# Patient Record
Sex: FEMALE | Race: BLACK OR AFRICAN AMERICAN | NOT HISPANIC OR LATINO | URBAN - METROPOLITAN AREA
[De-identification: names, ages, dates, MRNs, and addresses within clinical notes are randomized per-mention and may not be internally consistent; named-entity substitution may affect disease eponyms.]

---
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
## 2020-01-05 ENCOUNTER — TRANSCRIPTION ENCOUNTER (OUTPATIENT)
Age: 20
End: 2020-01-05

## 2020-03-09 ENCOUNTER — TRANSCRIPTION ENCOUNTER (OUTPATIENT)
Age: 20
End: 2020-03-09

## 2020-03-11 ENCOUNTER — APPOINTMENT (OUTPATIENT)
Dept: ORTHOPEDIC SURGERY | Facility: CLINIC | Age: 20
End: 2020-03-11
Payer: COMMERCIAL

## 2020-03-11 VITALS — HEIGHT: 65 IN

## 2020-03-11 DIAGNOSIS — M54.2 CERVICALGIA: ICD-10-CM

## 2020-03-11 DIAGNOSIS — S13.9XXA SPRAIN OF JOINTS AND LIGAMENTS OF UNSPECIFIED PARTS OF NECK, INITIAL ENCOUNTER: ICD-10-CM

## 2020-03-11 PROCEDURE — 99203 OFFICE O/P NEW LOW 30 MIN: CPT

## 2020-03-11 PROCEDURE — 72040 X-RAY EXAM NECK SPINE 2-3 VW: CPT

## 2020-03-11 NOTE — HISTORY OF PRESENT ILLNESS
[de-identified] : Ms. JESUS TOLENTINO  is a 19 year old female who presents with neck pain since last Tuesday when she cracked her neck.  Initially  it was not that bad, but then on Saturday her pain increased and she went to urgent care.    She has been  taking nsaids and a muscle relaxer and her symptoms have improved.  Denies any UE radicular symptoms.  Normal bowel and bladder control.   Denies any recent fevers, chills, sweats, weight loss, or infection.\par \par The patients past medical history, past surgical history, medications, allergies, and social history were reviewed by me today with the patient and documented accordingly.  In addition, the patient's family history, which is noncontributory to their visit, was also reviewed.\par

## 2020-03-11 NOTE — DISCUSSION/SUMMARY
[de-identified] : Overall she is improving. She will continue with nonsurgical measures. She will let me know of any changes or worsening of her symptoms.

## 2021-07-31 ENCOUNTER — EMERGENCY (EMERGENCY)
Facility: HOSPITAL | Age: 21
LOS: 1 days | Discharge: HOME | End: 2021-07-31
Attending: STUDENT IN AN ORGANIZED HEALTH CARE EDUCATION/TRAINING PROGRAM | Admitting: EMERGENCY MEDICINE
Payer: COMMERCIAL

## 2021-07-31 VITALS
SYSTOLIC BLOOD PRESSURE: 118 MMHG | HEART RATE: 53 BPM | DIASTOLIC BLOOD PRESSURE: 69 MMHG | TEMPERATURE: 98 F | RESPIRATION RATE: 16 BRPM | OXYGEN SATURATION: 100 %

## 2021-07-31 DIAGNOSIS — Z98.89 OTHER SPECIFIED POSTPROCEDURAL STATES: Chronic | ICD-10-CM

## 2021-07-31 PROCEDURE — 10080 I&D PILONIDAL CYST SIMPLE: CPT

## 2021-07-31 PROCEDURE — 99283 EMERGENCY DEPT VISIT LOW MDM: CPT | Mod: 25

## 2021-07-31 RX ORDER — IBUPROFEN 200 MG
400 TABLET ORAL ONCE
Refills: 0 | Status: COMPLETED | OUTPATIENT
Start: 2021-07-31 | End: 2021-07-31

## 2021-07-31 RX ORDER — AZTREONAM 2 G
1 VIAL (EA) INJECTION
Qty: 10 | Refills: 0
Start: 2021-07-31 | End: 2021-08-04

## 2021-07-31 RX ORDER — ACETAMINOPHEN 500 MG
650 TABLET ORAL ONCE
Refills: 0 | Status: COMPLETED | OUTPATIENT
Start: 2021-07-31 | End: 2021-07-31

## 2021-07-31 RX ORDER — LIDOCAINE HCL 20 MG/ML
5 VIAL (ML) INJECTION ONCE
Refills: 0 | Status: COMPLETED | OUTPATIENT
Start: 2021-07-31 | End: 2021-07-31

## 2021-07-31 RX ADMIN — Medication 650 MILLIGRAM(S): at 13:07

## 2021-07-31 RX ADMIN — Medication 400 MILLIGRAM(S): at 13:07

## 2021-07-31 RX ADMIN — Medication 5 MILLILITER(S): at 13:10

## 2021-07-31 NOTE — ED ADULT TRIAGE NOTE - CHIEF COMPLAINT QUOTE
pt c/o abscess to tailbone and worsening pain. Last took Advil midnight last night. Denies fever, drainage to site. No PMH

## 2021-07-31 NOTE — ED PROVIDER NOTE - CLINICAL SUMMARY MEDICAL DECISION MAKING FREE TEXT BOX
This is a 20 yr old F, no pmh with c/o abscess to the tailone. Pt states it started Monday and it is getting worst, swollen, painful unable to sit.  Wound care-   Abx sent to pharmacy

## 2021-07-31 NOTE — ED PROVIDER NOTE - PATIENT PORTAL LINK FT
You can access the FollowMyHealth Patient Portal offered by [facility] by registering at the following website: [email or web address]. By joining [facility] [employer] [facility]’s FollowMyHealth portal, you will also be able to view your health information using other applications (apps) compatible with our system.

## 2021-07-31 NOTE — ED ADULT NURSE NOTE - OBJECTIVE STATEMENT
19y/o female A&ox4, ambulatory received in intake1. pt c/o abscess to tailbone. pt reports taking motrin with no relief of pain. respirations even and unlabored. denies chest pain, sob, dizziness, lightheadedness, abd pain, back pain. NP at bedside for drainage of abscess. md at bedside for eval. medicated as per MD orders. pt in NAD. bed in lowest position, side rails up, call bell in hand, safety maintained. awaiting further orders. will continue to monitor.

## 2021-07-31 NOTE — ED PROVIDER NOTE - OBJECTIVE STATEMENT
This is a 20 yr old F, no pmh with c/o abscess to the tailone. Pt states it started Monday and it is getting worst, swollen, painful unable to sit. This is a 20 yr old F, no pmh with c/o abscess to the tailbone. Pt states it started Monday and it is getting worst, swollen, painful unable to sit, uncomfortable, crying.

## 2021-07-31 NOTE — ED PROVIDER NOTE - ATTENDING CONTRIBUTION TO CARE
I (Faviola) agree with above, I performed a history and physical. Counseled [facility] staff on medical decision making as documented, additionally and/or with the following exceptions: patient was ammenable to drainage in the ED, not septic, abx not indicated, patient tolerated the procedure well, given follow up and return precautions.

## 2021-08-02 LAB
CULTURE RESULTS: SIGNIFICANT CHANGE UP
SPECIMEN SOURCE: SIGNIFICANT CHANGE UP

## 2021-08-02 NOTE — ED PROCEDURE NOTE - ATTENDING CONTRIBUTION TO CARE
I (Faviola) agree with above, I performed a history and physical. Counseled [facility] staff on medical decision making as documented, additionally and/or with the following exceptions: the pilonidal cysts was anesthetized and a 5mm incision was performed at the dome of the abscess and the the absecss was cultured and drained, given surgery follow up.

## 2023-01-26 ENCOUNTER — EMERGENCY (EMERGENCY)
Facility: HOSPITAL | Age: 23
LOS: 1 days | Discharge: HOME | End: 2023-01-26
Attending: EMERGENCY MEDICINE
Payer: COMMERCIAL

## 2023-01-26 VITALS
HEART RATE: 97 BPM | WEIGHT: 210.1 LBS | DIASTOLIC BLOOD PRESSURE: 84 MMHG | OXYGEN SATURATION: 99 % | SYSTOLIC BLOOD PRESSURE: 131 MMHG | HEIGHT: 65 IN | TEMPERATURE: 99 F | RESPIRATION RATE: 16 BRPM

## 2023-01-26 DIAGNOSIS — Z98.89 OTHER SPECIFIED POSTPROCEDURAL STATES: Chronic | ICD-10-CM

## 2023-01-26 PROCEDURE — 99053 MED SERV 10PM-8AM 24 HR FAC: CPT

## 2023-01-26 PROCEDURE — 99284 EMERGENCY DEPT VISIT MOD MDM: CPT

## 2023-01-27 VITALS
TEMPERATURE: 98 F | OXYGEN SATURATION: 100 % | HEART RATE: 80 BPM | SYSTOLIC BLOOD PRESSURE: 111 MMHG | RESPIRATION RATE: 17 BRPM | DIASTOLIC BLOOD PRESSURE: 73 MMHG

## 2023-01-27 LAB
ALBUMIN SERPL ELPH-MCNC: 4.3 G/DL — SIGNIFICANT CHANGE UP (ref 3.3–5)
ALP SERPL-CCNC: 102 U/L — SIGNIFICANT CHANGE UP (ref 40–120)
ALT FLD-CCNC: 21 U/L — SIGNIFICANT CHANGE UP (ref 10–45)
ANION GAP SERPL CALC-SCNC: 12 MMOL/L — SIGNIFICANT CHANGE UP (ref 5–17)
APPEARANCE UR: CLEAR — SIGNIFICANT CHANGE UP
APTT BLD: 19.7 SEC — LOW (ref 27.5–35.5)
AST SERPL-CCNC: 20 U/L — SIGNIFICANT CHANGE UP (ref 10–40)
BACTERIA # UR AUTO: ABNORMAL
BASOPHILS # BLD AUTO: 0.03 K/UL — SIGNIFICANT CHANGE UP (ref 0–0.2)
BASOPHILS NFR BLD AUTO: 0.5 % — SIGNIFICANT CHANGE UP (ref 0–2)
BILIRUB SERPL-MCNC: 0.2 MG/DL — SIGNIFICANT CHANGE UP (ref 0.2–1.2)
BILIRUB UR-MCNC: NEGATIVE — SIGNIFICANT CHANGE UP
BLD GP AB SCN SERPL QL: NEGATIVE — SIGNIFICANT CHANGE UP
BUN SERPL-MCNC: 10 MG/DL — SIGNIFICANT CHANGE UP (ref 7–23)
CALCIUM SERPL-MCNC: 9.6 MG/DL — SIGNIFICANT CHANGE UP (ref 8.4–10.5)
CHLORIDE SERPL-SCNC: 102 MMOL/L — SIGNIFICANT CHANGE UP (ref 96–108)
CO2 SERPL-SCNC: 25 MMOL/L — SIGNIFICANT CHANGE UP (ref 22–31)
COLOR SPEC: SIGNIFICANT CHANGE UP
CREAT SERPL-MCNC: 0.76 MG/DL — SIGNIFICANT CHANGE UP (ref 0.5–1.3)
DIFF PNL FLD: NEGATIVE — SIGNIFICANT CHANGE UP
EGFR: 114 ML/MIN/1.73M2 — SIGNIFICANT CHANGE UP
EOSINOPHIL # BLD AUTO: 0.16 K/UL — SIGNIFICANT CHANGE UP (ref 0–0.5)
EOSINOPHIL NFR BLD AUTO: 2.5 % — SIGNIFICANT CHANGE UP (ref 0–6)
EPI CELLS # UR: 3 /HPF — SIGNIFICANT CHANGE UP
GLUCOSE SERPL-MCNC: 82 MG/DL — SIGNIFICANT CHANGE UP (ref 70–99)
GLUCOSE UR QL: NEGATIVE — SIGNIFICANT CHANGE UP
HCG SERPL-ACNC: <2 MIU/ML — SIGNIFICANT CHANGE UP
HCT VFR BLD CALC: 34.5 % — SIGNIFICANT CHANGE UP (ref 34.5–45)
HGB BLD-MCNC: 10.6 G/DL — LOW (ref 11.5–15.5)
HYALINE CASTS # UR AUTO: 1 /LPF — SIGNIFICANT CHANGE UP (ref 0–2)
IMM GRANULOCYTES NFR BLD AUTO: 0.2 % — SIGNIFICANT CHANGE UP (ref 0–0.9)
INR BLD: 1.05 RATIO — SIGNIFICANT CHANGE UP (ref 0.88–1.16)
KETONES UR-MCNC: NEGATIVE — SIGNIFICANT CHANGE UP
LEUKOCYTE ESTERASE UR-ACNC: NEGATIVE — SIGNIFICANT CHANGE UP
LIDOCAIN IGE QN: 16 U/L — SIGNIFICANT CHANGE UP (ref 7–60)
LYMPHOCYTES # BLD AUTO: 2.53 K/UL — SIGNIFICANT CHANGE UP (ref 1–3.3)
LYMPHOCYTES # BLD AUTO: 39.4 % — SIGNIFICANT CHANGE UP (ref 13–44)
MCHC RBC-ENTMCNC: 23.8 PG — LOW (ref 27–34)
MCHC RBC-ENTMCNC: 30.7 GM/DL — LOW (ref 32–36)
MCV RBC AUTO: 77.5 FL — LOW (ref 80–100)
MONOCYTES # BLD AUTO: 0.54 K/UL — SIGNIFICANT CHANGE UP (ref 0–0.9)
MONOCYTES NFR BLD AUTO: 8.4 % — SIGNIFICANT CHANGE UP (ref 2–14)
NEUTROPHILS # BLD AUTO: 3.15 K/UL — SIGNIFICANT CHANGE UP (ref 1.8–7.4)
NEUTROPHILS NFR BLD AUTO: 49 % — SIGNIFICANT CHANGE UP (ref 43–77)
NITRITE UR-MCNC: NEGATIVE — SIGNIFICANT CHANGE UP
NRBC # BLD: 0 /100 WBCS — SIGNIFICANT CHANGE UP (ref 0–0)
PH UR: 6 — SIGNIFICANT CHANGE UP (ref 5–8)
PLATELET # BLD AUTO: 337 K/UL — SIGNIFICANT CHANGE UP (ref 150–400)
POTASSIUM SERPL-MCNC: 3.6 MMOL/L — SIGNIFICANT CHANGE UP (ref 3.5–5.3)
POTASSIUM SERPL-SCNC: 3.6 MMOL/L — SIGNIFICANT CHANGE UP (ref 3.5–5.3)
PROT SERPL-MCNC: 7.6 G/DL — SIGNIFICANT CHANGE UP (ref 6–8.3)
PROT UR-MCNC: NEGATIVE — SIGNIFICANT CHANGE UP
PROTHROM AB SERPL-ACNC: 12.1 SEC — SIGNIFICANT CHANGE UP (ref 10.5–13.4)
RBC # BLD: 4.45 M/UL — SIGNIFICANT CHANGE UP (ref 3.8–5.2)
RBC # FLD: 15.8 % — HIGH (ref 10.3–14.5)
RBC CASTS # UR COMP ASSIST: 2 /HPF — SIGNIFICANT CHANGE UP (ref 0–4)
RH IG SCN BLD-IMP: POSITIVE — SIGNIFICANT CHANGE UP
SODIUM SERPL-SCNC: 139 MMOL/L — SIGNIFICANT CHANGE UP (ref 135–145)
SP GR SPEC: 1.03 — HIGH (ref 1.01–1.02)
UROBILINOGEN FLD QL: NEGATIVE — SIGNIFICANT CHANGE UP
WBC # BLD: 6.42 K/UL — SIGNIFICANT CHANGE UP (ref 3.8–10.5)
WBC # FLD AUTO: 6.42 K/UL — SIGNIFICANT CHANGE UP (ref 3.8–10.5)
WBC UR QL: 3 /HPF — SIGNIFICANT CHANGE UP (ref 0–5)

## 2023-01-27 PROCEDURE — 80053 COMPREHEN METABOLIC PANEL: CPT

## 2023-01-27 PROCEDURE — 81001 URINALYSIS AUTO W/SCOPE: CPT

## 2023-01-27 PROCEDURE — 85610 PROTHROMBIN TIME: CPT

## 2023-01-27 PROCEDURE — 74177 CT ABD & PELVIS W/CONTRAST: CPT | Mod: MA

## 2023-01-27 PROCEDURE — 87086 URINE CULTURE/COLONY COUNT: CPT

## 2023-01-27 PROCEDURE — 85025 COMPLETE CBC W/AUTO DIFF WBC: CPT

## 2023-01-27 PROCEDURE — 74177 CT ABD & PELVIS W/CONTRAST: CPT | Mod: 26,MA

## 2023-01-27 PROCEDURE — 83690 ASSAY OF LIPASE: CPT

## 2023-01-27 PROCEDURE — 85730 THROMBOPLASTIN TIME PARTIAL: CPT

## 2023-01-27 PROCEDURE — 36415 COLL VENOUS BLD VENIPUNCTURE: CPT

## 2023-01-27 PROCEDURE — 84702 CHORIONIC GONADOTROPIN TEST: CPT

## 2023-01-27 PROCEDURE — 86901 BLOOD TYPING SEROLOGIC RH(D): CPT

## 2023-01-27 PROCEDURE — 96375 TX/PRO/DX INJ NEW DRUG ADDON: CPT

## 2023-01-27 PROCEDURE — 86900 BLOOD TYPING SEROLOGIC ABO: CPT

## 2023-01-27 PROCEDURE — 96366 THER/PROPH/DIAG IV INF ADDON: CPT

## 2023-01-27 PROCEDURE — 86850 RBC ANTIBODY SCREEN: CPT

## 2023-01-27 PROCEDURE — 96365 THER/PROPH/DIAG IV INF INIT: CPT | Mod: XU

## 2023-01-27 PROCEDURE — 99284 EMERGENCY DEPT VISIT MOD MDM: CPT | Mod: 25

## 2023-01-27 RX ORDER — ONDANSETRON 8 MG/1
4 TABLET, FILM COATED ORAL ONCE
Refills: 0 | Status: COMPLETED | OUTPATIENT
Start: 2023-01-27 | End: 2023-01-27

## 2023-01-27 RX ORDER — ACETAMINOPHEN 500 MG
1000 TABLET ORAL ONCE
Refills: 0 | Status: COMPLETED | OUTPATIENT
Start: 2023-01-27 | End: 2023-01-27

## 2023-01-27 RX ORDER — KETOROLAC TROMETHAMINE 30 MG/ML
15 SYRINGE (ML) INJECTION ONCE
Refills: 0 | Status: DISCONTINUED | OUTPATIENT
Start: 2023-01-27 | End: 2023-01-27

## 2023-01-27 RX ORDER — SODIUM CHLORIDE 9 MG/ML
1000 INJECTION INTRAMUSCULAR; INTRAVENOUS; SUBCUTANEOUS ONCE
Refills: 0 | Status: COMPLETED | OUTPATIENT
Start: 2023-01-27 | End: 2023-01-27

## 2023-01-27 RX ADMIN — Medication 400 MILLIGRAM(S): at 05:03

## 2023-01-27 RX ADMIN — Medication 15 MILLIGRAM(S): at 06:43

## 2023-01-27 RX ADMIN — ONDANSETRON 4 MILLIGRAM(S): 8 TABLET, FILM COATED ORAL at 05:03

## 2023-01-27 RX ADMIN — Medication 1000 MILLIGRAM(S): at 06:39

## 2023-01-27 RX ADMIN — SODIUM CHLORIDE 1000 MILLILITER(S): 9 INJECTION INTRAMUSCULAR; INTRAVENOUS; SUBCUTANEOUS at 08:12

## 2023-01-27 RX ADMIN — Medication 15 MILLIGRAM(S): at 08:12

## 2023-01-27 RX ADMIN — SODIUM CHLORIDE 1000 MILLILITER(S): 9 INJECTION INTRAMUSCULAR; INTRAVENOUS; SUBCUTANEOUS at 05:02

## 2023-01-27 RX ADMIN — Medication 1000 MILLIGRAM(S): at 08:12

## 2023-01-27 NOTE — ED PROVIDER NOTE - PROGRESS NOTE DETAILS
Patrick Ferrer, PGY1 - CT result discussed with the patient. No acute patholoiges. PAtient feels better, but mildly tender. Will give toradol. Will reassess. PO challenge. and DC with return precautions. Glo Bauer, PGY-1: Pt reassessed. Reports improvement of pain after toradol. Passed PO challenge.   No nausea/vomiting

## 2023-01-27 NOTE — ED PROVIDER NOTE - PHYSICAL EXAMINATION
General: Uncomfortable appearing.   HEENT: NCAT. Non erythematous, non swollen tonsils. No exudates.  Cardiac: RRR, 2+ radial pulses  Chest: CTA  Abdomen: soft, non-distended, + ttp RLQ. No McBurney's point tenderness, + obturator sign, no rebound or guarding  Extremities: no peripheral edema, calf tenderness, or leg size discrepancies  Skin: no rashes  Neuro: AAOx3, motor and sensory grossly intact.   Psych: mood and affect appropriate COUGH

## 2023-01-27 NOTE — ED PROVIDER NOTE - NSTIMEPROVIDERCAREINITIATE_GEN_ER
82 y/o female with history of CAD s/p stents 2013, hypothyroidism, HLD, HTN, HLD, DM, dementia (baseline AOx1) presents to the ED for weakness/fall and increased urinary frequency, found to have positive UA in the ED, admitted for IV abx.  UTI refractory to Macrobid poor penetration of Macrobid for lower  infection with GFR < 60.    Suggest:  - ceftriaxone for 3 days  - f/u UC    Please call the ID service [phone] with questions or concerns over the weekend 27-Jan-2023 02:59

## 2023-01-27 NOTE — ED PROVIDER NOTE - NSFOLLOWUPINSTRUCTIONS_ED_ALL_ED_FT
Follow up with your primary care within 1-3 days  Treat your pain with advil and tylenol.  Come back for any worsening or concerning symptoms    Acute Abdominal Pain    WHAT YOU NEED TO KNOW:  The cause of your abdominal pain may not be found. If a cause is found, treatment will depend on what the cause is.    DISCHARGE INSTRUCTIONS:    Seek care immediately if:  You vomit blood or cannot stop vomiting.  You have blood in your bowel movement or it looks like tar.  You have bleeding from your rectum.  Your abdomen is larger than usual, more painful, and hard.  You have severe pain in your abdomen.  You stop passing gas and having bowel movements.  You feel weak, dizzy, or faint.  Contact your healthcare provider if:  You have a fever.  You have new signs and symptoms.  Your symptoms do not get better with treatment.  You have questions or concerns about your condition or care.  Medicines may be given to decrease pain, treat an infection, and manage your symptoms. Take your medicine as directed. Call your healthcare provider if you think your medicine is not helping or if you have side effects. Tell him if you are allergic to any medicine. Keep a list of the medicines, vitamins, and herbs you take. Include the amounts, and when and why you take them. Bring the list or the pill bottles to follow-up visits. Carry your medicine list with you in case of an emergency.    Manage your symptoms:    Apply heat on your abdomen for 20 to 30 minutes every 2 hours for as many days as directed. Heat helps decrease pain and muscle spasms.  Manage your stress. Stress may cause abdominal pain. Your healthcare provider may recommend relaxation techniques and deep breathing exercises to help decrease your stress. Your healthcare provider may recommend you talk to someone about your stress or anxiety, such as a counselor or a trusted friend. Get plenty of sleep and exercise regularly.  Limit or do not drink alcohol. Alcohol can make your abdominal pain worse. Ask your healthcare provider if it is safe for you to drink alcohol. Also ask how much is safe for you to drink.  Do not smoke. Nicotine and other chemicals in cigarettes can damage your esophagus and stomach. Ask your healthcare provider for information if you currently smoke and need help to quit. E-cigarettes or smokeless tobacco still contain nicotine. Talk to your healthcare provider before you use these products.  Make changes to the food you eat as directed: Do not eat foods that cause abdominal pain or other symptoms. Eat small meals more often.  Eat more high-fiber foods if you are constipated. High-fiber foods include fruits, vegetables, whole-grain foods, and legumes.  Do not eat foods that cause gas if you have bloating. Examples include broccoli, cabbage, and cauliflower. Do not drink soda or carbonated drinks, because these may also cause gas.  Do not eat foods or drinks that contain sorbitol or fructose if you have diarrhea and bloating. Some examples are fruit juices, candy, jelly, and sugar-free gum.  Do not eat high-fat foods, such as fried foods, cheeseburgers, hot dogs, and desserts.  Limit or do not drink caffeine. Caffeine may make symptoms, such as heart burn or nausea, worse.  Drink plenty of liquids to prevent dehydration from diarrhea or vomiting. Ask your healthcare provider how much liquid to drink each day and which liquids are best for you.  Follow up with your healthcare provider as directed: Write down your questions so you remember to ask them during your visits.

## 2023-01-27 NOTE — ED PROVIDER NOTE - CLINICAL SUMMARY MEDICAL DECISION MAKING FREE TEXT BOX
This is a 22 year old female with no significant pmh or surgical history presenting with 5 hours of RLQ abdominal pain that started suddenly associated with nausea. Denies any vaginal bleeding, urinary sxs. Low grade temp of 37.2 C. RLQ tenderness. + obturator sign. Given acuteness of RLQ pain associated with nausea, will obtain CT w/ IV contrast to eval for appendicitis. Will obtain labs, treat with IV fluids and IV tylenol. Will obtain urinalysis. Pending reassess and f/u with CT imaging.

## 2023-01-27 NOTE — ED PROVIDER NOTE - PATIENT PORTAL LINK FT
You can access the FollowMyHealth Patient Portal offered by [facility] by registering at the following website: [email or web address]. By joining [employer]’s FollowMyHealth portal, you will also be able to view your health information using other applications (apps) compatible with our system.

## 2023-01-27 NOTE — ED PROVIDER NOTE - OBJECTIVE STATEMENT
This is a 22 year old female with no significant pmh or surgical history presenting with 5 hours of RLQ abdominal pain that started suddenly associated with nausea. Denies any vaginal bleeding, urinary sxs. Hasn't taken any pain medications so far. Hurts to move, walk, sit.
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
## 2023-01-27 NOTE — ED PROVIDER NOTE - ATTENDING CONTRIBUTION TO CARE
Patient presents with sudden onset right lower quadrant pain onset a few hours ago along with nausea.  Patient denies diarrhea, urinary symptoms.  On exam, patient looks uncomfortable and has tenderness in the right lower quadrant without guarding or rebound.  Vital signs are unremarkable.  Patient will get labs and a CT to assess for acute intra-abdominal pathology such as appendicitis versus other acute surgical process, less likely suspicion for GYN pathology but will reassess after initial imaging.

## 2023-01-29 LAB
CULTURE RESULTS: SIGNIFICANT CHANGE UP
SPECIMEN SOURCE: SIGNIFICANT CHANGE UP

## 2023-01-30 NOTE — ED POST DISCHARGE NOTE - DETAILS
1/30/23: Results d/w pt, pt reports urinary urgency, advised pt to have repeat outpatient urine testing and pt report she had urine testing performed yesterday at an  and she will f/u the results. -[place] PA-C

## 2024-04-17 NOTE — ED ADULT NURSE NOTE - NSFALLRSKOUTCOME_ED_ALL_ED
Lunch ordered for this patient at this time. Pt resting comfortably in bed, denies needs at this time. Patient in no acute distress. Call light within reach, bed in lowest position, side rails up x2.        Universal Safety Interventions

## 2024-08-30 PROBLEM — Z78.9 OTHER SPECIFIED HEALTH STATUS: Chronic | Status: ACTIVE | Noted: 2023-01-27

## 2024-09-07 ENCOUNTER — EMERGENCY (EMERGENCY)
Facility: HOSPITAL | Age: 24
LOS: 1 days | End: 2024-09-07
Admitting: EMERGENCY MEDICINE
Payer: COMMERCIAL

## 2024-09-07 VITALS
HEART RATE: 96 BPM | TEMPERATURE: 99 F | RESPIRATION RATE: 16 BRPM | DIASTOLIC BLOOD PRESSURE: 78 MMHG | HEIGHT: 65 IN | WEIGHT: 220.02 LBS | SYSTOLIC BLOOD PRESSURE: 114 MMHG | OXYGEN SATURATION: 98 %

## 2024-09-07 DIAGNOSIS — Z98.89 OTHER SPECIFIED POSTPROCEDURAL STATES: Chronic | ICD-10-CM

## 2024-09-07 PROCEDURE — L9991: CPT

## 2024-09-08 ENCOUNTER — EMERGENCY (EMERGENCY)
Facility: HOSPITAL | Age: 24
LOS: 1 days | Discharge: HOME | End: 2024-09-08
Attending: EMERGENCY MEDICINE
Payer: COMMERCIAL

## 2024-09-08 VITALS
OXYGEN SATURATION: 99 % | TEMPERATURE: 99 F | HEIGHT: 65 IN | SYSTOLIC BLOOD PRESSURE: 115 MMHG | DIASTOLIC BLOOD PRESSURE: 74 MMHG | RESPIRATION RATE: 18 BRPM | WEIGHT: 214.95 LBS | HEART RATE: 91 BPM

## 2024-09-08 DIAGNOSIS — Z98.89 OTHER SPECIFIED POSTPROCEDURAL STATES: Chronic | ICD-10-CM

## 2024-09-08 LAB
ADD ON TEST-SPECIMEN IN LAB: SIGNIFICANT CHANGE UP
ADD ON TEST-SPECIMEN IN LAB: SIGNIFICANT CHANGE UP
ALBUMIN SERPL ELPH-MCNC: 3.8 G/DL — SIGNIFICANT CHANGE UP (ref 3.3–5)
ALP SERPL-CCNC: 82 U/L — SIGNIFICANT CHANGE UP (ref 40–120)
ALT FLD-CCNC: 18 U/L — SIGNIFICANT CHANGE UP (ref 10–45)
ANION GAP SERPL CALC-SCNC: 15 MMOL/L — SIGNIFICANT CHANGE UP (ref 5–17)
AST SERPL-CCNC: 16 U/L — SIGNIFICANT CHANGE UP (ref 10–40)
BASOPHILS # BLD AUTO: 0.02 K/UL — SIGNIFICANT CHANGE UP (ref 0–0.2)
BASOPHILS NFR BLD AUTO: 0.3 % — SIGNIFICANT CHANGE UP (ref 0–2)
BILIRUB SERPL-MCNC: 0.3 MG/DL — SIGNIFICANT CHANGE UP (ref 0.2–1.2)
BLD GP AB SCN SERPL QL: NEGATIVE — SIGNIFICANT CHANGE UP
BUN SERPL-MCNC: 6 MG/DL — LOW (ref 7–23)
CALCIUM SERPL-MCNC: 9.5 MG/DL — SIGNIFICANT CHANGE UP (ref 8.4–10.5)
CHLORIDE SERPL-SCNC: 99 MMOL/L — SIGNIFICANT CHANGE UP (ref 96–108)
CO2 SERPL-SCNC: 21 MMOL/L — LOW (ref 22–31)
CREAT SERPL-MCNC: 0.7 MG/DL — SIGNIFICANT CHANGE UP (ref 0.5–1.3)
EGFR: 125 ML/MIN/1.73M2 — SIGNIFICANT CHANGE UP
EOSINOPHIL # BLD AUTO: 0.03 K/UL — SIGNIFICANT CHANGE UP (ref 0–0.5)
EOSINOPHIL NFR BLD AUTO: 0.4 % — SIGNIFICANT CHANGE UP (ref 0–6)
GLUCOSE SERPL-MCNC: 88 MG/DL — SIGNIFICANT CHANGE UP (ref 70–99)
HCG SERPL-ACNC: HIGH MIU/ML
HCT VFR BLD CALC: 36 % — SIGNIFICANT CHANGE UP (ref 34.5–45)
HGB BLD-MCNC: 11.5 G/DL — SIGNIFICANT CHANGE UP (ref 11.5–15.5)
IMM GRANULOCYTES NFR BLD AUTO: 0.3 % — SIGNIFICANT CHANGE UP (ref 0–0.9)
LIDOCAIN IGE QN: 27 U/L — SIGNIFICANT CHANGE UP (ref 7–60)
LYMPHOCYTES # BLD AUTO: 1.37 K/UL — SIGNIFICANT CHANGE UP (ref 1–3.3)
LYMPHOCYTES # BLD AUTO: 19.9 % — SIGNIFICANT CHANGE UP (ref 13–44)
MAGNESIUM SERPL-MCNC: 1.9 MG/DL — SIGNIFICANT CHANGE UP (ref 1.6–2.6)
MCHC RBC-ENTMCNC: 24.3 PG — LOW (ref 27–34)
MCHC RBC-ENTMCNC: 31.9 GM/DL — LOW (ref 32–36)
MCV RBC AUTO: 76.1 FL — LOW (ref 80–100)
MONOCYTES # BLD AUTO: 0.7 K/UL — SIGNIFICANT CHANGE UP (ref 0–0.9)
MONOCYTES NFR BLD AUTO: 10.2 % — SIGNIFICANT CHANGE UP (ref 2–14)
NEUTROPHILS # BLD AUTO: 4.75 K/UL — SIGNIFICANT CHANGE UP (ref 1.8–7.4)
NEUTROPHILS NFR BLD AUTO: 68.9 % — SIGNIFICANT CHANGE UP (ref 43–77)
NRBC # BLD: 0 /100 WBCS — SIGNIFICANT CHANGE UP (ref 0–0)
PHOSPHATE SERPL-MCNC: 3.7 MG/DL — SIGNIFICANT CHANGE UP (ref 2.5–4.5)
PLATELET # BLD AUTO: 290 K/UL — SIGNIFICANT CHANGE UP (ref 150–400)
POTASSIUM SERPL-MCNC: 3.4 MMOL/L — LOW (ref 3.5–5.3)
POTASSIUM SERPL-SCNC: 3.4 MMOL/L — LOW (ref 3.5–5.3)
PROT SERPL-MCNC: 7.6 G/DL — SIGNIFICANT CHANGE UP (ref 6–8.3)
RBC # BLD: 4.73 M/UL — SIGNIFICANT CHANGE UP (ref 3.8–5.2)
RBC # FLD: 16 % — HIGH (ref 10.3–14.5)
RH IG SCN BLD-IMP: POSITIVE — SIGNIFICANT CHANGE UP
SODIUM SERPL-SCNC: 135 MMOL/L — SIGNIFICANT CHANGE UP (ref 135–145)
WBC # BLD: 6.89 K/UL — SIGNIFICANT CHANGE UP (ref 3.8–10.5)
WBC # FLD AUTO: 6.89 K/UL — SIGNIFICANT CHANGE UP (ref 3.8–10.5)

## 2024-09-08 PROCEDURE — 99285 EMERGENCY DEPT VISIT HI MDM: CPT

## 2024-09-08 RX ORDER — SODIUM CHLORIDE 9 MG/ML
1000 INJECTION INTRAMUSCULAR; INTRAVENOUS; SUBCUTANEOUS ONCE
Refills: 0 | Status: COMPLETED | OUTPATIENT
Start: 2024-09-08 | End: 2024-09-08

## 2024-09-08 RX ORDER — POTASSIUM CHLORIDE 10 MEQ
20 TABLET, EXT RELEASE, PARTICLES/CRYSTALS ORAL ONCE
Refills: 0 | Status: COMPLETED | OUTPATIENT
Start: 2024-09-08 | End: 2024-09-08

## 2024-09-08 RX ORDER — PROMETHAZINE HYDROCHLORIDE 25 MG/1
25 TABLET ORAL ONCE
Refills: 0 | Status: COMPLETED | OUTPATIENT
Start: 2024-09-08 | End: 2024-09-08

## 2024-09-08 RX ORDER — METOCLOPRAMIDE HCL 5 MG
10 TABLET ORAL ONCE
Refills: 0 | Status: ACTIVE | OUTPATIENT
Start: 2024-09-08 | End: 2024-09-08

## 2024-09-08 RX ORDER — ONDANSETRON 2 MG/ML
4 INJECTION, SOLUTION INTRAMUSCULAR; INTRAVENOUS ONCE
Refills: 0 | Status: COMPLETED | OUTPATIENT
Start: 2024-09-08 | End: 2024-09-08

## 2024-09-08 RX ADMIN — Medication 20 MILLIEQUIVALENT(S): at 22:52

## 2024-09-08 RX ADMIN — SODIUM CHLORIDE 1000 MILLILITER(S): 9 INJECTION INTRAMUSCULAR; INTRAVENOUS; SUBCUTANEOUS at 20:39

## 2024-09-08 RX ADMIN — Medication 100 MILLILITER(S): at 22:06

## 2024-09-08 RX ADMIN — ONDANSETRON 4 MILLIGRAM(S): 2 INJECTION, SOLUTION INTRAMUSCULAR; INTRAVENOUS at 20:39

## 2024-09-08 NOTE — ED PROVIDER NOTE - PHYSICAL EXAMINATION
Gen: NAD, non-toxic appearing  Head: normal appearing  HEENT: normal conjunctiva, oral mucosa dry   Lung: no respiratory distress, speaking in full sentences, CTA b/l     CV: regular rate and rhythm, no murmurs  Abd: soft, non distended, non tender   MSK: no visible deformities  Neuro: No focal deficits, AAOx3  Skin: Warm  Psych: normal affect

## 2024-09-08 NOTE — CONSULT NOTE ADULT - ASSESSMENT
23y G  P    at      by LMP/Early US presenting with severe nausea and vomiting in pregnancy.      -IV fluid repletion with additives: thiamine 100mg, MVI 10ml, 0.6mg folic acid with initial one liter. Add vitamin B6 25mg in each liter bag with both initial bolus and also in maintenance fluids.   -Zofran 4 mg IV q6hrs prn, Reglan 10 mg IV q6hrs prn, Phenergan Suppository 25 mg BID   -recommend amylase, lipase, and thyroid panel to assess for other causes of nausea/vomiting  -recommend TVUS to assess fetal viability vs. abnormal pregnancy   -PO challenge  -If patient passes PO challenge, recommend the following for discharge:           -diclegis 10-10--2 tabs at night, 1 tab qam, 1 tab in the afternoon.           -If patient's insurance does not cover diclegis, she may substitute with the following OTC meds: pyridoxine 25mg and 1/2 tab of unisom 25mg together q6h          -zofran 4mg ODT q8h. Patient may increase to 2 tabs q 8h if needed          -pepcid 20mg qam  -patient counseled on dietary changes  -GYN will continue to follow       **incomplete note**  Monique Chavez PGY2 2yo  @ 11w1d (LMP , [facility] 3/29) presents with nausea and vomiting in pregnancy.      -IV fluid repletion with additives: thiamine 100mg, MVI 10ml, 0.6mg folic acid with initial one liter. Add vitamin B6 25mg in each liter bag with both initial bolus and also in maintenance fluids.   -Zofran 4 mg IV q6hrs prn, Reglan 10 mg IV q6hrs prn, Phenergan Suppository 25 mg BID   -recommend amylase, lipase, and thyroid panel to assess for other causes of nausea/vomiting  -recommend TVUS to assess fetal viability vs. abnormal pregnancy   -PO challenge  -If patient passes PO challenge, recommend the following for discharge:           -diclegis 10-10--2 tabs at night, 1 tab qam, 1 tab in the afternoon.           -If patient's insurance does not cover diclegis, she may substitute with the following OTC meds: pyridoxine 25mg and 1/2 tab of unisom 25mg together q6h          -zofran 4mg ODT q8h. Patient may increase to 2 tabs q 8h if needed          -pepcid 20mg qam          -Phenergan Suppository 25 mg BID   -patient counseled on dietary changes  -GYN will continue to follow       **incomplete note**  Monique Chavez PGY2 4yo  @ 11w1d (LMP , [facility] 3/29) presents with nausea and vomiting in pregnancy.      -Recommend UA to assess ketones  -IV fluid repletion  -Zofran 4 mg IV q6hrs prn, Reglan 10 mg IV q6hrs prn, Phenergan Suppository 25 mg BID   -recommend amylase, lipase, and thyroid panel to assess for other causes of nausea/vomiting  -Pt with confirmed IUP - pt with record of confirmed IUP. Recommend obtaining FHT to assess viability   -PO challenge  -If patient passes PO challenge, recommend the following for discharge:           Diclegis 10-10--2 tabs at night, 1 tab qam, 1 tab in the afternoon.           -If patient's insurance does not cover Diclegis she may substitute with the following OTC meds: pyridoxine 25mg and 1/2 tab of unisom 25mg together q6h          Zofran 4mg ODT q8h. Patient may increase to 2 tabs q 8h if needed          Pepcid 20mg qam          -Phenergan Suppository 25 mg BID   -patient counseled on dietary changes including small frequent meals, bland food, Pearl candy, and Tums PRN  -GYN will continue to follow   -Pt to f/u outpt with Dr. Middleton (scheduled appt )    D/w attending physician, Dr. Avelina Chavez PGY2 22yo  @ 11w1d (LMP , [facility] 3/29) presents with nausea and vomiting in pregnancy.      -Recommend UA to assess ketones  -IV fluid repletion  -Zofran 4 mg IV q6hrs prn, Reglan 10 mg IV q6hrs prn, Phenergan Suppository 25 mg BID   -recommend amylase, lipase, and thyroid panel to assess for other causes of nausea/vomiting  -Pt with confirmed IUP - pt with record of confirmed IUP. Recommend obtaining FHT to assess viability   -PO challenge  -If patient passes PO challenge, recommend the following for discharge:           Diclegis 10-10--2 tabs at night, 1 tab qam, 1 tab in the afternoon.           -If patient's insurance does not cover Diclegis she may substitute with the following OTC meds: pyridoxine 25mg and 1/2 tab of unisom 25mg together q6h          Zofran 4mg ODT q8h. Patient may increase to 2 tabs q 8h if needed          Pepcid 20mg qam          -Phenergan Suppository 25 mg BID   -patient counseled on dietary changes including small frequent meals, bland food, Pearl candy, and Tums PRN  -GYN will continue to follow   -Pt to f/u outpt with Dr. Middleton (scheduled appt )    D/w attending physician, Dr. Avelina Chavez PGY2

## 2024-09-08 NOTE — ED PROVIDER NOTE - PATIENT PORTAL LINK FT
You can access the FollowMyHealth Patient Portal offered by [facility] by registering at the following website: [email or web address]. By joining [employer]’s FollowMyHealth portal, you will also be able to view your health information using other applications (apps) compatible with our system.

## 2024-09-08 NOTE — ED ADULT NURSE NOTE - OBJECTIVE STATEMENT
24y/o female , 11wks pregnant, presents to ED a&ox4, c/o vomiting. Patient reports she has been vomiting consistently, unable to tolerate PO. States she came here yesterday but left due to long wait time. Reports she is coming back tonight for continued vomiting and reports she vomited a "blood clot" today while at work. Endorsing some abdominal cramping and palpitations as well. Denies vaginal bleeding, urinary symptoms, sob, cp, dizziness, weakness, fever.

## 2024-09-08 NOTE — ED PROVIDER NOTE - CLINICAL SUMMARY MEDICAL DECISION MAKING FREE TEXT BOX
Afebrile hemodynamically stable 11 weeks gestation female in ED for recurrent nausea/vomiting and decreased p.o. intake x 2 weeks.  Physical exam notable for nontender nondistended soft abdomen.  Dry mucosal membranes.  Normal breath sounds bilaterally saturating well on room air.  Will order basic labs, mag/Phos, hCG, type and screen, UA to rule out electrolyte abnormality in the setting of hyperemesis gravidarum.  Given fluids and Zofran, reassess.  Consider consulting OB/GYN if symptoms persist despite fluid hydration and antiemetics. Afebrile hemodynamically stable 11 weeks gestation female in ED for recurrent nausea/vomiting and decreased p.o. intake x 2 weeks.  Physical exam notable for nontender nondistended soft abdomen.  Dry mucosal membranes.  Normal breath sounds bilaterally saturating well on room air.  Will order basic labs, mag/Phos, hCG, type and screen, UA to rule out electrolyte abnormality in the setting of hyperemesis gravidarum.  Given fluids and Zofran, reassess.  Consider consulting OB/GYN if symptoms persist despite fluid hydration and antiemetics.    see attending attestation authored by me, Mario Montiel MD, for further MDM details.

## 2024-09-08 NOTE — CONSULT NOTE ADULT - SUBJECTIVE AND OBJECTIVE BOX
JESUS TOLENTINO    23y    Female    [id number]    HPI: 24yo G_P_ with PMSHx significant for * presents with *          Name of Ob/Gyn Physician:  OBHx:  GynHx: Denies  PMHx: Denies  PSHx: Denies  Meds: Denies  All: NKDA      Vital Signs Last 24 Hrs  T(C): 37.2 (08 Sep 2024 20:11), Max: 37.2 (08 Sep 2024 20:11)  T(F): 98.9 (08 Sep 2024 20:11), Max: 98.9 (08 Sep 2024 20:11)  HR: 91 (08 Sep 2024 20:11) (91 - 91)  BP: 115/74 (08 Sep 2024 20:11) (115/74 - 115/74)  BP(mean): --  RR: 18 (08 Sep 2024 20:11) (18 - 18)  SpO2: 99% (08 Sep 2024 20:11) (99% - 99%)        PHYSICAL EXAM:   Gen: NAD   Cardiovascular: Clinically well perfused  Respiratory: Breathing comfortably on RA  Abd: Soft, non-tender, non-distended  Pelvic: deferred  Extremities: Non-tender, non-edematous; able to move all ext equally  Neuro: AOx3      LABS:                        11.5   6.89  )-----------( 290      ( 08 Sep 2024 20:44 )             36.0     09-08    135  |  99  |  6<L>  ----------------------------<  88  3.4<L>   |  21<L>  |  0.70    Ca    9.5      08 Sep 2024 20:44  Phos  3.7     09-08  Mg     1.9     09-08    TPro  7.6  /  Alb  3.8  /  TBili  0.3  /  DBili  x   /  AST  16  /  ALT  18  /  AlkPhos  82  09-08      Urinalysis Basic - ( 08 Sep 2024 20:44 )    Color: x / Appearance: x / SG: x / pH: x  Gluc: 88 mg/dL / Ketone: x  / Bili: x / Urobili: x   Blood: x / Protein: x / Nitrite: x   Leuk Esterase: x / RBC: x / WBC x   Sq Epi: x / Non Sq Epi: x / Bacteria: x        RADIOLOGY & ADDITIONAL STUDIES:   JESUS TOLENTINO    23y    Female    [id number]    HPI: 24yo  @ 11w1d (LMP , INNA 3/29) presents with nausea and vomiting. Pt reports she has been nauseous and vomiting since 6 wks of pregnancy. Has been taking Unisom and Vit B6 nightly.   Had episode of blood clot while vomiting at 645p today. That was first time she ever vomited blood. Has not vomited since.   Denies VB, LOF, CTX.   Denies fevers, chills, chest pain, SOB, lightheadedness, dizziness.       Name of Ob/Gyn Physician: Dr. Veronica Silva, transferred care to Dr. Middleton (has not seen her yet)  OBHx:  TOP w/ d&c x1  GynHx: Denies  PMHx: Denies  PSHx: Denies  Meds: Denies  All: NKDA      Vital Signs Last 24 Hrs  T(C): 37.2 (08 Sep 2024 20:11), Max: 37.2 (08 Sep 2024 20:11)  T(F): 98.9 (08 Sep 2024 20:11), Max: 98.9 (08 Sep 2024 20:11)  HR: 91 (08 Sep 2024 20:11) (91 - 91)  BP: 115/74 (08 Sep 2024 20:11) (115/74 - 115/74)  BP(mean): --  RR: 18 (08 Sep 2024 20:11) (18 - 18)  SpO2: 99% (08 Sep 2024 20:11) (99% - 99%)        PHYSICAL EXAM:   Gen: NAD   Cardiovascular: Clinically well perfused  Respiratory: Breathing comfortably on RA  Abd: Soft, non-tender, non-distended  Pelvic: deferred  Extremities: Non-tender, non-edematous; able to move all ext equally  Neuro: AOx3      LABS:                        11.5   6.89  )-----------( 290      ( 08 Sep 2024 20:44 )             36.0     09-    135  |  99  |  6<L>  ----------------------------<  88  3.4<L>   |  21<L>  |  0.70    Ca    9.5      08 Sep 2024 20:44  Phos  3.7     -  Mg     1.9     -    TPro  7.6  /  Alb  3.8  /  TBili  0.3  /  DBili  x   /  AST  16  /  ALT  18  /  AlkPhos  82  -      Urinalysis Basic - ( 08 Sep 2024 20:44 )    Color: x / Appearance: x / SG: x / pH: x  Gluc: 88 mg/dL / Ketone: x  / Bili: x / Urobili: x   Blood: x / Protein: x / Nitrite: x   Leuk Esterase: x / RBC: x / WBC x   Sq Epi: x / Non Sq Epi: x / Bacteria: x        RADIOLOGY & ADDITIONAL STUDIES:   JESUS TOLENTINO    23y    Female    [id number]    HPI: 24yo  @ 11w1d (LMP , INNA 3/29) presents with nausea and vomiting. Pt reports she has been nauseous and vomiting since 6 wks of pregnancy. Had passage of small blood clot while vomiting at 645p today. That was first time she ever vomited blood. Has not vomited since. Pt last ate sun chips this evening but was unable to tolerate it.   Has been taking Unisom and Vit B6 nightly.     Denies VB, LOF, CTX.   Denies fevers, chills, chest pain, SOB, lightheadedness, dizziness.       Name of Ob/Gyn Physician: Dr. Veronica Silva, transferred care to Dr. Middleton (has not seen her yet)  OBHx:  TOP w/ d&c x1  GynHx: chlamydia fully txted   PMHx: Denies  PSHx: tonsillectomy (child), d&c ()  Meds: PNV, VitB6, Unisom  All: NKDA      Vital Signs Last 24 Hrs  T(C): 37.2 (08 Sep 2024 20:11), Max: 37.2 (08 Sep 2024 20:11)  T(F): 98.9 (08 Sep 2024 20:11), Max: 98.9 (08 Sep 2024 20:11)  HR: 91 (08 Sep 2024 20:11) (91 - 91)  BP: 115/74 (08 Sep 2024 20:11) (115/74 - 115/74)  BP(mean): --  RR: 18 (08 Sep 2024 20:11) (18 - 18)  SpO2: 99% (08 Sep 2024 20:11) (99% - 99%)        PHYSICAL EXAM:   Gen: NAD   Cardiovascular: Clinically well perfused  Respiratory: Breathing comfortably on RA  Abd: Soft, non-tender, non-distended  Pelvic: deferred  Extremities: Non-tender, non-edematous; able to move all ext equally  Neuro: AOx3      LABS:                        11.5   6.89  )-----------( 290      ( 08 Sep 2024 20:44 )             36.0     09-08    135  |  99  |  6<L>  ----------------------------<  88  3.4<L>   |  21<L>  |  0.70    Ca    9.5      08 Sep 2024 20:44  Phos  3.7       Mg     1.9         TPro  7.6  /  Alb  3.8  /  TBili  0.3  /  DBili  x   /  AST  16  /  ALT  18  /  AlkPhos  82        Urinalysis Basic - ( 08 Sep 2024 20:44 )    Color: x / Appearance: x / SG: x / pH: x  Gluc: 88 mg/dL / Ketone: x  / Bili: x / Urobili: x   Blood: x / Protein: x / Nitrite: x   Leuk Esterase: x / RBC: x / WBC x   Sq Epi: x / Non Sq Epi: x / Bacteria: x        RADIOLOGY & ADDITIONAL STUDIES:

## 2024-09-08 NOTE — ED PROVIDER NOTE - ATTENDING CONTRIBUTION TO CARE
24 yo female @ 11 weeks p/w N/V x at least 2 weeks.  on diclegis @ home, no improvement in sx.  has appt with [facility] this week.  family @ bedside for collateral.     nontoxic on exam.  no abdominal pain.  CBC, CMP sent, bolused IVF, s/p zofran.  OB consult, to the bedside.  continue to medicate and reassess, admission vs continued outpatient management of hyperemesis.

## 2024-09-08 NOTE — CONSULT NOTE ADULT - TIME BILLING
/Attendinyo  @ 11.1wks, w/ LMP  and documented IUP, who presents with nausea and vomiting in pregnancy.  Pt discussed with me; chart reviewed; I have read and reviewed the above Resident's assessment and plan and I agree.    Pt presents w/ c/o nausea and emesis since 6wks pregnant, however, today she saw a blood clot in her emesis at appr. 6:45pm after eating Sun Chips.  Pt denies any further emesis or hemoptysis.  Denies any other symptoms.  Pt says she was being seen by a Dr. Veronica Silva, but has transferred care to Dr. Middleton (has not seen her yet) and has an appt. scheduled.  Pt is currently on B6 and Unisom as prescribed.    VSS, afebrile  PE wnl, as per Resident    Labs as above, except mild hypokalemia (K+: 3.4)    A/P  -22y/o  w/ an IUP at 11.1kws who presents w/ NVOP and 1 episode of hemoptysis.  -Recommend a U/A to assess Ketones and if positive, should get IV hydration and should get a PO challenge.  -Pt to continue on Vit. B6 and Unisom and can also add Phenergan Suppositories; pt can also use Zofran as needed, as well as Pepcid as needed.  -Should also get a FHR to ensure viability of fetus.  -Pt to f/u with her OB    Dr. Alvares

## 2024-09-08 NOTE — ED PROVIDER NOTE - OBJECTIVE STATEMENT
23-year-old  11 weeks gestation presents to ED for nausea/vomiting and decreased p.o. intake x 2 weeks.  States that she has had blood-tinged vomit earlier today after multiple bouts of emesis.  Notes currently on vitamin B6 and Unisom with no symptomatic relief.  Denies headaches, dizziness, dizziness, lightheadedness, chest pain, change in bowel movement, urinary symptoms, vaginal discharge.

## 2024-09-08 NOTE — ED PROVIDER NOTE - PROGRESS NOTE DETAILS
Jose Martin Gloria MD, PGY3  Patient tolerated p.o. intake.  Ketones in urine.  Discussed with OB, patient cleared for discharge as long as tolerating p.o. intake.  Will DC home.  Answered all questions and gave return precautions. Jose Martin Gloria MD, PGY3  Patient tolerated p.o. intake.  Ketones in urine.  Discussed with OB, patient cleared for discharge as long as tolerating p.o. intake.  Will DC home.  Answered all questions and gave return precautions.  FHR: 144

## 2024-09-08 NOTE — ED PROVIDER NOTE - NSFOLLOWUPINSTRUCTIONS_ED_ALL_ED_FT
Stable
Hyperemesis Gravidarum    WHAT YOU NEED TO KNOW:    What is hyperemesis gravidarum? Hyperemesis gravidarum is a severe form of nausea and vomiting that happens during pregnancy. Hyperemesis is more severe than morning sickness. It may cause you to have nausea or vomiting all day for many days. It may also keep you from getting enough food and liquid.    What increases my risk for hyperemesis gravidarum? The cause of hyperemesis is not known. Any of the following can increase your risk:    Pregnant with more than 1 baby    History of motion sickness or migraine headaches    Hyperemesis with a previous pregnancy    Family history of hyperemesis    Pregnant with a girl baby  What are the signs and symptoms of hyperemesis gravidarum? Signs and symptoms usually begin in the first trimester (first 12 weeks). Hyperemesis often goes away during the second trimester (after 20 weeks) but may continue through the entire pregnancy.    Severe nausea and vomiting, and dry retching    Easily affected by strong smells    Poor appetite or change in taste    Symptoms of dehydration, such as dark yellow urine, dry mouth and lips, dry skin, and urinating less than usual    Fatigue    Dizziness when you stand    Weight loss  How is hyperemesis gravidarum diagnosed? Your healthcare provider will examine you and check your weight. He or she will test for other problems. If no other problems are found, your provider will diagnose hyperemesis gravidarum. Blood and urine tests may show dehydration. The tests may also show how well your organs are working. You may need an ultrasound to make sure your baby is okay.    How is hyperemesis gravidarum treated and managed? You may be admitted to the hospital if you are dehydrated. Your healthcare provider may suggest any of the following:    Eat small amounts of food every 1 to 2 hours. Some examples of good foods to eat include broth, toast, fruit, eggs, gelatin, or cottage cheese. Do not eat spicy or high-fat foods. Try to eat crackers before getting out of bed each morning. Foods and drinks with ginger, such as ginger ale, may help to decrease nausea and vomiting.    Drink liquids as directed. You may need to drink small amounts of liquid often to prevent dehydration. Ask how much liquid to drink each day and which liquids are best for you.    Rest when you need to. Start activity slowly and work up to your usual routine as you start to feel better.    Avoid things that may make hyperemesis worse. Avoid odors, heat, and humidity. Limit noise and flickering lights.    Medicines, vitamins, or supplements may be given to help decrease nausea and vomiting.    Weigh yourself daily if directed by your healthcare provider. You may need to keep a record of your daily weights for your healthcare provider. He or she may want to make sure you are not losing too much weight.  When should I seek immediate care?    You have signs of severe dehydration including little to no urine and dry mouth or lips.    You have severe stomach pain.    You feel too weak or dizzy to stand up.    You see blood in your vomit or bowel movements.  When should I contact my healthcare provider?    You cannot keep any food or liquid down.    You are losing weight.    You have a fever.    You have questions or concerns about your condition or care.

## 2024-09-09 VITALS
TEMPERATURE: 98 F | OXYGEN SATURATION: 99 % | SYSTOLIC BLOOD PRESSURE: 119 MMHG | RESPIRATION RATE: 17 BRPM | DIASTOLIC BLOOD PRESSURE: 78 MMHG | HEART RATE: 84 BPM

## 2024-09-09 LAB
APPEARANCE UR: CLEAR — SIGNIFICANT CHANGE UP
BACTERIA # UR AUTO: NEGATIVE /HPF — SIGNIFICANT CHANGE UP
BILIRUB UR-MCNC: NEGATIVE — SIGNIFICANT CHANGE UP
CAST: 0 /LPF — SIGNIFICANT CHANGE UP (ref 0–4)
COLOR SPEC: YELLOW — SIGNIFICANT CHANGE UP
DIFF PNL FLD: NEGATIVE — SIGNIFICANT CHANGE UP
GLUCOSE UR QL: NEGATIVE MG/DL — SIGNIFICANT CHANGE UP
KETONES UR-MCNC: >=160 MG/DL
LEUKOCYTE ESTERASE UR-ACNC: NEGATIVE — SIGNIFICANT CHANGE UP
NITRITE UR-MCNC: NEGATIVE — SIGNIFICANT CHANGE UP
PH UR: 6 — SIGNIFICANT CHANGE UP (ref 5–8)
PROT UR-MCNC: NEGATIVE MG/DL — SIGNIFICANT CHANGE UP
RBC CASTS # UR COMP ASSIST: 2 /HPF — SIGNIFICANT CHANGE UP (ref 0–4)
SP GR SPEC: 1.02 — SIGNIFICANT CHANGE UP (ref 1–1.03)
SQUAMOUS # UR AUTO: 5 /HPF — SIGNIFICANT CHANGE UP (ref 0–5)
UROBILINOGEN FLD QL: 1 MG/DL — SIGNIFICANT CHANGE UP (ref 0.2–1)
WBC UR QL: 2 /HPF — SIGNIFICANT CHANGE UP (ref 0–5)

## 2024-09-09 PROCEDURE — 84100 ASSAY OF PHOSPHORUS: CPT

## 2024-09-09 PROCEDURE — 36415 COLL VENOUS BLD VENIPUNCTURE: CPT

## 2024-09-09 PROCEDURE — 80053 COMPREHEN METABOLIC PANEL: CPT

## 2024-09-09 PROCEDURE — 85025 COMPLETE CBC W/AUTO DIFF WBC: CPT

## 2024-09-09 PROCEDURE — 84702 CHORIONIC GONADOTROPIN TEST: CPT

## 2024-09-09 PROCEDURE — 86900 BLOOD TYPING SEROLOGIC ABO: CPT

## 2024-09-09 PROCEDURE — 86850 RBC ANTIBODY SCREEN: CPT

## 2024-09-09 PROCEDURE — 83690 ASSAY OF LIPASE: CPT

## 2024-09-09 PROCEDURE — 84443 ASSAY THYROID STIM HORMONE: CPT

## 2024-09-09 PROCEDURE — 99284 EMERGENCY DEPT VISIT MOD MDM: CPT | Mod: 25

## 2024-09-09 PROCEDURE — 83735 ASSAY OF MAGNESIUM: CPT

## 2024-09-09 PROCEDURE — 86901 BLOOD TYPING SEROLOGIC RH(D): CPT

## 2024-09-09 PROCEDURE — 81001 URINALYSIS AUTO W/SCOPE: CPT

## 2024-09-09 PROCEDURE — 82150 ASSAY OF AMYLASE: CPT

## 2024-09-09 PROCEDURE — 96374 THER/PROPH/DIAG INJ IV PUSH: CPT

## 2024-09-09 PROCEDURE — 93005 ELECTROCARDIOGRAM TRACING: CPT

## 2024-09-17 ENCOUNTER — APPOINTMENT (OUTPATIENT)
Dept: OBGYN | Facility: CLINIC | Age: 24
End: 2024-09-17
Payer: COMMERCIAL

## 2024-09-17 ENCOUNTER — ASOB RESULT (OUTPATIENT)
Age: 24
End: 2024-09-17

## 2024-09-17 ENCOUNTER — APPOINTMENT (OUTPATIENT)
Dept: OBGYN | Facility: CLINIC | Age: 24
End: 2024-09-17

## 2024-09-17 VITALS
DIASTOLIC BLOOD PRESSURE: 74 MMHG | SYSTOLIC BLOOD PRESSURE: 106 MMHG | BODY MASS INDEX: 34.99 KG/M2 | WEIGHT: 210 LBS | HEIGHT: 65 IN

## 2024-09-17 DIAGNOSIS — O21.1 HYPEREMESIS GRAVIDARUM WITH METABOLIC DISTURBANCE: ICD-10-CM

## 2024-09-17 DIAGNOSIS — Z34.91 ENCOUNTER FOR SUPERVISION OF NORMAL PREGNANCY, UNSPECIFIED, FIRST TRIMESTER: ICD-10-CM

## 2024-09-17 PROCEDURE — 0500F INITIAL PRENATAL CARE VISIT: CPT

## 2024-09-17 PROCEDURE — 36415 COLL VENOUS BLD VENIPUNCTURE: CPT

## 2024-09-17 PROCEDURE — 76801 OB US < 14 WKS SINGLE FETUS: CPT

## 2024-09-17 PROCEDURE — 76813 OB US NUCHAL MEAS 1 GEST: CPT

## 2024-09-17 RX ORDER — ONDANSETRON 4 MG/1
4 TABLET ORAL
Qty: 90 | Refills: 3 | Status: ACTIVE | COMMUNITY
Start: 2024-09-17 | End: 1900-01-01

## 2024-09-18 LAB
ALBUMIN SERPL ELPH-MCNC: 3.9 G/DL
ALP BLD-CCNC: 92 U/L
ALT SERPL-CCNC: 14 U/L
ANION GAP SERPL CALC-SCNC: 19 MMOL/L
APPEARANCE: CLEAR
AST SERPL-CCNC: 15 U/L
BACTERIA: ABNORMAL /HPF
BILIRUB SERPL-MCNC: 0.3 MG/DL
BILIRUBIN URINE: NEGATIVE
BLOOD URINE: NEGATIVE
BUN SERPL-MCNC: 5 MG/DL
BV BACTERIA RRNA VAG QL NAA+PROBE: DETECTED
C GLABRATA RNA VAG QL NAA+PROBE: NOT DETECTED
C TRACH RRNA SPEC QL NAA+PROBE: NOT DETECTED
CALCIUM SERPL-MCNC: 9.2 MG/DL
CANDIDA RRNA VAG QL PROBE: NOT DETECTED
CAST: 1 /LPF
CHLORIDE SERPL-SCNC: 98 MMOL/L
CO2 SERPL-SCNC: 20 MMOL/L
COLOR: NORMAL
CREAT SERPL-MCNC: 0.62 MG/DL
EGFR: 128 ML/MIN/1.73M2
EPITHELIAL CELLS: 10 /HPF
GLUCOSE QUALITATIVE U: NEGATIVE MG/DL
GLUCOSE SERPL-MCNC: 55 MG/DL
HPV HIGH+LOW RISK DNA PNL CVX: NOT DETECTED
KETONES URINE: >=160 MG/DL
LEUKOCYTE ESTERASE URINE: NEGATIVE
MICROSCOPIC-UA: NORMAL
MUCUS: PRESENT
N GONORRHOEA RRNA SPEC QL NAA+PROBE: NOT DETECTED
NITRITE URINE: NEGATIVE
PH URINE: 6.5
POTASSIUM SERPL-SCNC: 3.7 MMOL/L
PROT SERPL-MCNC: 7.2 G/DL
PROTEIN URINE: 30 MG/DL
RED BLOOD CELLS URINE: 5 /HPF
REVIEW: NORMAL
SODIUM SERPL-SCNC: 137 MMOL/L
SPECIFIC GRAVITY URINE: 1.03
T VAGINALIS RRNA SPEC QL NAA+PROBE: NOT DETECTED
UROBILINOGEN URINE: 1 MG/DL
WHITE BLOOD CELLS URINE: 2 /HPF

## 2024-09-19 LAB — BACTERIA UR CULT: NORMAL

## 2024-09-21 LAB — CYTOLOGY CVX/VAG DOC THIN PREP: ABNORMAL

## 2024-09-25 ENCOUNTER — NON-APPOINTMENT (OUTPATIENT)
Age: 24
End: 2024-09-25

## 2024-10-01 DIAGNOSIS — B96.89 ACUTE VAGINITIS: ICD-10-CM

## 2024-10-01 DIAGNOSIS — N76.0 ACUTE VAGINITIS: ICD-10-CM

## 2024-10-01 RX ORDER — CLINDAMYCIN PHOSPHATE 100 MG/1
100 SUPPOSITORY VAGINAL
Qty: 3 | Refills: 0 | Status: ACTIVE | COMMUNITY
Start: 2024-10-01 | End: 1900-01-01

## 2024-10-16 ENCOUNTER — NON-APPOINTMENT (OUTPATIENT)
Age: 24
End: 2024-10-16

## 2024-10-16 ENCOUNTER — TRANSCRIPTION ENCOUNTER (OUTPATIENT)
Age: 24
End: 2024-10-16

## 2024-10-16 ENCOUNTER — APPOINTMENT (OUTPATIENT)
Dept: OBGYN | Facility: CLINIC | Age: 24
End: 2024-10-16
Payer: COMMERCIAL

## 2024-10-16 DIAGNOSIS — Z34.02 ENCOUNTER FOR SUPERVISION OF NORMAL FIRST PREGNANCY, SECOND TRIMESTER: ICD-10-CM

## 2024-10-16 PROCEDURE — 0502F SUBSEQUENT PRENATAL CARE: CPT

## 2024-10-21 ENCOUNTER — TRANSCRIPTION ENCOUNTER (OUTPATIENT)
Age: 24
End: 2024-10-21

## 2024-10-21 LAB
AFP INTERP SERPL-IMP: NORMAL
AFP INTERP SERPL-IMP: NORMAL
AFP MOM CUT-OFF: 2.8
AFP MOM SERPL: 1.63
AFP PERCENTILE: 93.6
AFP SERPL-ACNC: 47.85 NG/ML
CARBAMAZEPINE?: NO
CURRENT SMOKER: NORMAL
DIABETES STATUS PATIENT: NORMAL
GA: NORMAL
GESTATIONAL AGE METHOD: NORMAL
HX OF NTD NARR: NORMAL
MULTIPLE PREGNANCY: NORMAL
NEURAL TUBE DEFECT RISK FETUS: NORMAL
NEURAL TUBE DEFECT RISK POP: NORMAL
RECOM F/U: NO
TEST PERFORMANCE INFO SPEC: NORMAL
VALPROIC ACID?: NORMAL

## 2024-11-05 ENCOUNTER — EMERGENCY (EMERGENCY)
Facility: HOSPITAL | Age: 24
LOS: 1 days | Discharge: HOME | End: 2024-11-05
Attending: EMERGENCY MEDICINE
Payer: COMMERCIAL

## 2024-11-05 VITALS
DIASTOLIC BLOOD PRESSURE: 75 MMHG | SYSTOLIC BLOOD PRESSURE: 111 MMHG | TEMPERATURE: 98 F | RESPIRATION RATE: 18 BRPM | HEIGHT: 65 IN | OXYGEN SATURATION: 98 % | HEART RATE: 138 BPM

## 2024-11-05 VITALS
SYSTOLIC BLOOD PRESSURE: 117 MMHG | HEART RATE: 91 BPM | OXYGEN SATURATION: 98 % | RESPIRATION RATE: 18 BRPM | DIASTOLIC BLOOD PRESSURE: 67 MMHG

## 2024-11-05 DIAGNOSIS — Z98.89 OTHER SPECIFIED POSTPROCEDURAL STATES: Chronic | ICD-10-CM

## 2024-11-05 PROCEDURE — 87077 CULTURE AEROBIC IDENTIFY: CPT

## 2024-11-05 PROCEDURE — 10081 I&D PILONIDAL CYST COMP: CPT

## 2024-11-05 PROCEDURE — 10080 I&D PILONIDAL CYST SIMPLE: CPT

## 2024-11-05 PROCEDURE — 87186 SC STD MICRODIL/AGAR DIL: CPT

## 2024-11-05 PROCEDURE — 99284 EMERGENCY DEPT VISIT MOD MDM: CPT | Mod: 25

## 2024-11-05 PROCEDURE — 87070 CULTURE OTHR SPECIMN AEROBIC: CPT

## 2024-11-05 PROCEDURE — 99283 EMERGENCY DEPT VISIT LOW MDM: CPT | Mod: 25

## 2024-11-05 RX ORDER — LIDOCAINE HCL 60 MG/3 ML
5 SYRINGE (ML) INJECTION ONCE
Refills: 0 | Status: DISCONTINUED | OUTPATIENT
Start: 2024-11-05 | End: 2024-11-05

## 2024-11-05 RX ORDER — LIDOCAINE/RACEPINEP/TETRACAINE 4-0.05-0.5
1 SOLUTION WITH PREFILLED APPLICATOR (ML) TOPICAL ONCE
Refills: 0 | Status: COMPLETED | OUTPATIENT
Start: 2024-11-05 | End: 2024-11-05

## 2024-11-05 RX ORDER — OXYCODONE HYDROCHLORIDE 30 MG/1
5 TABLET ORAL ONCE
Refills: 0 | Status: DISCONTINUED | OUTPATIENT
Start: 2024-11-05 | End: 2024-11-05

## 2024-11-05 RX ORDER — ACETAMINOPHEN 500 MG
650 TABLET ORAL ONCE
Refills: 0 | Status: COMPLETED | OUTPATIENT
Start: 2024-11-05 | End: 2024-11-05

## 2024-11-05 RX ORDER — LIDOCAINE HYDROCHLORIDE 40 MG/ML
1 SOLUTION TOPICAL ONCE
Refills: 0 | Status: COMPLETED | OUTPATIENT
Start: 2024-11-05 | End: 2024-11-05

## 2024-11-05 RX ADMIN — Medication 1 APPLICATION(S): at 01:30

## 2024-11-05 RX ADMIN — OXYCODONE HYDROCHLORIDE 5 MILLIGRAM(S): 30 TABLET ORAL at 01:33

## 2024-11-05 RX ADMIN — OXYCODONE HYDROCHLORIDE 5 MILLIGRAM(S): 30 TABLET ORAL at 03:00

## 2024-11-05 RX ADMIN — LIDOCAINE HYDROCHLORIDE 1 APPLICATION(S): 40 SOLUTION TOPICAL at 01:16

## 2024-11-05 RX ADMIN — Medication 650 MILLIGRAM(S): at 01:33

## 2024-11-05 NOTE — ED PROVIDER NOTE - OBJECTIVE STATEMENT
Stacy Rangel, Attending Physician: 23F 19 weeks pregnant here for upper buttock pain similar in quality to hx of pilonidal abscess. No fevers. Pt arrived tachycardic but upon arrival to room 26, HR 94 without intervention. No discharge. +fetal movement.

## 2024-11-05 NOTE — ED PROVIDER NOTE - NSFOLLOWUPINSTRUCTIONS_ED_ALL_ED_FT
You were seen for an abscess that was drained.    Use sitz bath (warm shallow water) to encourage drainage.    Follow up with surgeon to establish care and consider surgery post-partum to prevent recurrence.    Seek immediate medical care for symptoms including:  -redness  -swelling  -worsening pain  -fevers over 100.4  or if you have any new/worsening concerns.

## 2024-11-05 NOTE — ED ADULT TRIAGE NOTE - TEMPERATURE IN CELSIUS (DEGREES C)
[facility]  Cardiac Electrophysiology  History and Physical     Admission Date: 5/23/2019  Code Status: Prior   Attending Provider: Bob Pretty MD   Principal Problem:<principal problem not specified>    Subjective:     Chief Complaint:  SVT     HPI:  Hafsa Hawley is a 53 y.o. female who presents for follow-up of No chief complaint on file.  .      HPI:     Ms. Hawley is a 53 y.o. female with NICM, CHF, HTN, Sleep Apnea, paroxysmal atrial fibrillation (on eliquis), Fibromyalgia, ICD here for follow up.      Background:     Primary cardiologist is Dr. Cortez.  Longstanding history of non-ischemic cardiomyopathy.  [facility] 11/15/15 EF 30-35% with normal coronary arteries.  Has been on optimal medical therapy.  Admitted to [facility] 9/16 with acute decompensated Heart failure. Diuresed 12 liters.  Echo 8/24/16 EF 20%.  Repeat echo 10/17/16 EF 30-35% with small pericardial effusion.  ICD implanted 12/2/16 (VDD single pass lead).     Update (04/03/2019):     Patient has had several hospitalizations since last clinic visit:     8/20/2018: Went to ED with chest tightness and palpitations. HR in 150s and found to be in SVT. Given diltiazem and spontaneously converted back to sinus rhythm.   8/30/2018: CHF hospitalization. No arrhythmia during this admission.  10/21/2018: ED with CP. No arrhythmia during this admission. Device check 10/29/2019 showed SVTx2, max 52 seconds during this period.  11/17/2018: ED with CP. SVT on EKG. Spontaneous conversion.  1/27/2019: ED with chest pain and SOB. She was given Adnoesine 6mg x 1 with conversion to SR. Device check 1/30/2019 showed SVTx19, longest 10 minutes.     Today she says that she has been very stressed, unsure if she is going to have episodes of palpitations at any minute. Episodes occur usually when at rest, and are associated with CP and SOB. She is also being managed by HTS. Denies syncope.     She is currently taking eliquis 5mg BID for stroke  prophylaxis and denies significant bleeding episodes. She is currently being treated with coreg 25mg BID for HR control. Kidney function is stable, with a creatinine of 1.3 on 2/14/2019.     Recent visit to ED on 5/19 with chest discomfort, found to be in SVT (ECG reviewed, short RP tachycardia) with conversion to NSR with IV Cardizem push.    ECG today shows NSR with PVCs    Past Medical History:   Diagnosis Date    Anemia     Anticoagulant long-term use     Arthritis     Atrial fibrillation     Congestive heart failure     COPD (chronic obstructive pulmonary disease)     Deep vein thrombosis     elevated bilirubin d/t Gilbert's syndrome     confirmed by [place] genetic testing, evaluated by hepatology    Encounter for blood transfusion     GERD (gastroesophageal reflux disease)     Hypertension     Pheochromocytoma, malignant     Right kidney mass     Sleep apnea     Thyroid disease        Past Surgical History:   Procedure Laterality Date    APPENDECTOMY      BIOPSY, LIVER, TRANSJUGULAR APPROACH N/A 10/24/2018    Performed by [facility] Diagnostic Provider at [facility] OR 2ND FLR    CARDIAC DEFIBRILLATOR PLACEMENT Left 12/2016    EYE SURGERY      due to running tears    FRACTURE SURGERY Left     hand 5th digit    HEART CATH-RIGHT Right 7/22/2016    Performed by Rakesh Garcia MD at [facility] CATH LAB    HYSTERECTOMY      INSERTION-ICD-SINGLE N/A 12/2/2016    Performed by Bob Pretty MD at [facility] CATH LAB    KNEE SURGERY Left 2016    hematoma    LIVER BIOPSY  10/24/2018    Minimal steatosis, predominantly macrovesicular, 1%, Minimal nonspecific portal inflammation, no fibrosis. No findings on biopsy to explain elevated bilirubin levels. Could be d/t Gilbert's =?- hemolysis    PROCEDURE PROLAPSE HEMORRHOID (PPH) N/A 3/7/2017    Performed by GEORGE Rothman MD at [facility] OR    Right heart cath Right 2/6/2018    Performed by Benson Cortez MD at [facility] CATH LAB       Review of patient's allergies  "indicates:   Allergen Reactions    Penicillins Hives    Percocet [oxycodone-acetaminophen] Other (See Comments)     Nausea, Dizziness, Anxiety.  "I don't like how it makes me feel."   Given Hydromorphone 0.5mg IVP  Without problems.    Diovan hct [valsartan-hydrochlorothiazide] Other (See Comments)     Causes coughing    Irinotecan      Pt has homozygosity for the TA7 promoter variant that places this individual at significantly increased risk for   severe neutropenia (grade 4) when treated with the standard dose of irinotecan (risk approximately 50%).   Other drugs that have been demonstrated to be impacted by homozygosity for the UGT1A1 TA7 promoter variant include pazopanib, nilotinib, atazanavir, and belinostat. Metabolism of other drugs not listed here may also be impacted by UGT1A1 enzyme activity.       Tramadol Nausea And Vomiting       No current facility-administered medications on file prior to encounter.      Current Outpatient Medications on File Prior to Encounter   Medication Sig    ALPRAZolam (XANAX) 1 MG tablet Take 1 mg by mouth nightly as needed for Anxiety.    atorvastatin (LIPITOR) 40 MG tablet Take 1 tablet (40 mg total) by mouth once daily.    b complex vitamins tablet Take 1 tablet by mouth once daily.    cyanocobalamin, vitamin B-12, 50 mcg tablet Take 5 mcg by mouth once daily.    ELIQUIS 5 mg Tab TAKE 1 TABLET BY MOUTH TWICE DAILY    fluticasone-vilanterol (BREO ELLIPTA) 100-25 mcg/dose diskus inhaler Inhale 1 puff into the lungs once daily. Controller    furosemide (LASIX) 80 MG tablet Take 1 tablet (80 mg total) by mouth 2 (two) times daily. TAKE EXTRA DOSE FOR GAIN OF 2 OR MORE POUND WEIGHT IN 1 DAY OR 5 POUNDS IN 1 WEEK.    isosorbide dinitrate (ISORDIL) 20 MG tablet Take 1 tablet (20 mg total) by mouth 2 (two) times daily. Please check that this is what you are already taking at home.    pantoprazole (PROTONIX) 40 MG tablet Take 1 tablet by mouth once daily.    " potassium chloride (KLOR-CON) 10 MEQ TbSR Take 1 tablet (10 mEq total) by mouth once daily.    rOPINIRole (REQUIP) 0.5 MG tablet Take 0.5 mg by mouth once daily.    spironolactone (ALDACTONE) 25 MG tablet Take 1 tablet (25 mg total) by mouth once daily.    VENTOLIN HFA 90 mcg/actuation inhaler Inhale 2 puffs into the lungs 2 (two) times daily as needed.     albuterol-ipratropium 2.5mg-0.5mg/3mL (DUO-NEB) 0.5 mg-3 mg(2.5 mg base)/3 mL nebulizer solution Take 1 mL by nebulization every 6 (six) hours as needed for Wheezing or Shortness of Breath.    carvedilol (COREG) 25 MG tablet Take 1 tablet by mouth 2 (two) times daily.    NITROSTAT 0.4 mg SL tablet Take 2.5 tablets (1 mg total) by mouth every 5 (five) minutes as needed for Chest pain. No more than 3 tablets in 15 minutes.    walker Misc 1 Device by Misc.(Non-Drug; Combo Route) route as needed.     Family History     Problem Relation (Age of Onset)    Cancer Mother    Cirrhosis Brother    Diabetes Brother    Heart disease Father, Sister, Brother, Sister, Brother    Hypertension Father, Brother        Tobacco Use    Smoking status: Never Smoker    Smokeless tobacco: Never Used   Substance and Sexual Activity    Alcohol use: Yes     Frequency: Monthly or less     Drinks per session: 1 or 2     Comment: up to 1 yr ago drank 2-3 drinks on occasion but sporadic    Drug use: No    Sexual activity: Yes     Partners: Male     Review of Systems   Constitution: Negative for chills and fever.   HENT: Negative for hoarse voice and sore throat.    Cardiovascular: Positive for palpitations. Negative for chest pain, claudication, cyanosis, dyspnea on exertion, irregular heartbeat, leg swelling, near-syncope, orthopnea, paroxysmal nocturnal dyspnea and syncope.   Respiratory: Negative for cough, hemoptysis and shortness of breath.    Musculoskeletal: Negative for back pain, joint pain and joint swelling.   Gastrointestinal: Negative for abdominal pain, constipation,  diarrhea, hematochezia, melena, nausea and vomiting.   Genitourinary: Negative for dysuria, hematuria and incomplete emptying.   Neurological: Negative for dizziness, headaches and light-headedness.   Psychiatric/Behavioral: Negative for altered mental status, depression and suicidal ideas. The patient is not nervous/anxious.      Objective:     Vital Signs (Most Recent):  Temp: 98 °F (36.7 °C) (05/23/19 0558)  Pulse: 91 (05/23/19 0558)  Resp: 18 (05/23/19 0558)  BP: (!) 147/90 (05/23/19 0559)  SpO2: 95 % (05/23/19 0558) Vital Signs (24h Range):  Temp:  [98 °F (36.7 °C)] 98 °F (36.7 °C)  Pulse:  [91] 91  Resp:  [18] 18  SpO2:  [95 %] 95 %  BP: (147-150)/(90) 147/90       Weight: 92.5 kg (204 lb)  Body mass index is 32.93 kg/m².    SpO2: 95 %  O2 Device (Oxygen Therapy): room air    Physical Exam   Constitutional: She is oriented to person, place, and time. She appears well-developed and well-nourished. No distress.   HENT:   Head: Normocephalic and atraumatic.   Right Ear: External ear normal.   Left Ear: External ear normal.   Nose: Nose normal.   Mouth/Throat: Oropharynx is clear and moist. No oropharyngeal exudate.   Eyes: Conjunctivae and EOM are normal. Right eye exhibits no discharge. Left eye exhibits no discharge. No scleral icterus.   Neck: Normal range of motion. Neck supple. No JVD present. No tracheal deviation present. No thyromegaly present.   Cardiovascular: Normal rate, regular rhythm, normal heart sounds and intact distal pulses. Exam reveals no gallop and no friction rub.   No murmur heard.  Pulmonary/Chest: Effort normal and breath sounds normal. No stridor. No respiratory distress. She has no wheezes. She has no rales. She exhibits no tenderness.   Abdominal: Soft. Bowel sounds are normal. She exhibits no distension and no mass. There is no tenderness. There is no rebound and no guarding.   Musculoskeletal: Normal range of motion. She exhibits no edema, tenderness or deformity.   Lymphadenopathy:      She has no cervical adenopathy.   Neurological: She is alert and oriented to person, place, and time. No cranial nerve deficit.   Skin: Skin is warm and dry. No rash noted. She is not diaphoretic. No erythema. No pallor.   Psychiatric: She has a normal mood and affect. Her behavior is normal. Thought content normal.   Nursing note and vitals reviewed.        Assessment and Plan:     SVT (supraventricular tachycardia)  I discussed the nature of EP study and ablation, including possible transseptal puncture. We discused risks and benefits at length. Our discussion included, but was not limited to the risk of death, infection, bleeding, stroke, MI, cardiac perforation, embolism, cardiac tamponade, skin burns, and other organic injury including the possibility for need for surgery or pacemaker implantation.    Last dose of Coreg on 5/18 and last dose of Eliquis in AM of 5/23    Proceed with EPS +/- RFA for SVT  KO, MAC  1g Vancomycin prior        Reinier Red MD  Cardiac Electrophysiology  [facility]     36.9

## 2024-11-05 NOTE — ED PROVIDER NOTE - CLINICAL SUMMARY MEDICAL DECISION MAKING FREE TEXT BOX
Stacy Rangel, Attending Physician: 23F with likely recurrent pilonidal without evidence of cellulitis or systemic features of illness. We discussed analgesia with I&D given intolerance during last drainage and offered topical (initially ordered for Lidocaine 4% but given viscosity changed to LET) for I&D. Talked about other options and after risk/benefit discussion, pt amenable to 1x dose of oxycodone for pain control & local I&D.

## 2024-11-05 NOTE — ED PROVIDER NOTE - PHYSICAL EXAMINATION
PE:  Gen: NAD  Head: NCAT  ENT: MMM   Chest: WWP, HR 94   Lungs: Symmetrical chest rise  Abdomen: soft, NTND,  Ext: No gross deformities  Neuro: awake and alert, ambulating  Skin: no rashes,  upper buttock with induration without redness or warmth

## 2024-11-05 NOTE — ED ADULT NURSE NOTE - OBJECTIVE STATEMENT
24 yo female A&ox3, 19 weeks pregnant, presents to ED c/o abscess.  pt reports having upper buttock pain, hx of pilonidal cysts and reports it is similar pain.  Denies fevers.  Breathing even and unlabored, abdomen soft nontender, redness/warmth noted to upper buttock, no pedal edema.  Pt denies chest pain, palpitations, shortness of breath, headache, visual disturbances, numbness/tingling, fever, chills, diaphoresis,  nausea, vomiting, constipation, diarrhea, or urinary symptoms.

## 2024-11-05 NOTE — ED PROVIDER NOTE - NSFOLLOWUPCLINICS_GEN_ALL_ED_FT
[facility]  General Surgery  [address] [place] [address] - 3rd [place] [address]  Phone: [phone]  Fax:   Follow Up Time: 4-6 Days

## 2024-11-05 NOTE — ED PROVIDER NOTE - PATIENT PORTAL LINK FT
You can access the FollowMyHealth Patient Portal offered by [facility] by registering at the following website: [email or web address]. By joining [employer]’s FollowMyHealth portal, you will also be able to view your health information using other applications (apps) compatible with our system.

## 2024-11-07 LAB
-  AMOXICILLIN/CLAVULANIC ACID: SIGNIFICANT CHANGE UP
-  AMPICILLIN/SULBACTAM: SIGNIFICANT CHANGE UP
-  AMPICILLIN: SIGNIFICANT CHANGE UP
-  AZTREONAM: SIGNIFICANT CHANGE UP
-  CEFAZOLIN: SIGNIFICANT CHANGE UP
-  CEFEPIME: SIGNIFICANT CHANGE UP
-  CEFOXITIN: SIGNIFICANT CHANGE UP
-  CEFTRIAXONE: SIGNIFICANT CHANGE UP
-  CIPROFLOXACIN: SIGNIFICANT CHANGE UP
-  ERTAPENEM: SIGNIFICANT CHANGE UP
-  GENTAMICIN: SIGNIFICANT CHANGE UP
-  LEVOFLOXACIN: SIGNIFICANT CHANGE UP
-  MEROPENEM: SIGNIFICANT CHANGE UP
-  PIPERACILLIN/TAZOBACTAM: SIGNIFICANT CHANGE UP
-  TOBRAMYCIN: SIGNIFICANT CHANGE UP
-  TRIMETHOPRIM/SULFAMETHOXAZOLE: SIGNIFICANT CHANGE UP
METHOD TYPE: SIGNIFICANT CHANGE UP

## 2024-11-08 LAB
-  AMOXICILLIN/CLAVULANIC ACID: SIGNIFICANT CHANGE UP
-  AMPICILLIN/SULBACTAM: SIGNIFICANT CHANGE UP
-  AMPICILLIN: SIGNIFICANT CHANGE UP
-  AZTREONAM: SIGNIFICANT CHANGE UP
-  CEFAZOLIN: SIGNIFICANT CHANGE UP
-  CEFEPIME: SIGNIFICANT CHANGE UP
-  CEFOXITIN: SIGNIFICANT CHANGE UP
-  CEFTRIAXONE: SIGNIFICANT CHANGE UP
-  CIPROFLOXACIN: SIGNIFICANT CHANGE UP
-  ERTAPENEM: SIGNIFICANT CHANGE UP
-  GENTAMICIN: SIGNIFICANT CHANGE UP
-  IMIPENEM: SIGNIFICANT CHANGE UP
-  LEVOFLOXACIN: SIGNIFICANT CHANGE UP
-  MEROPENEM: SIGNIFICANT CHANGE UP
-  PIPERACILLIN/TAZOBACTAM: SIGNIFICANT CHANGE UP
-  TOBRAMYCIN: SIGNIFICANT CHANGE UP
-  TRIMETHOPRIM/SULFAMETHOXAZOLE: SIGNIFICANT CHANGE UP
CULTURE RESULTS: ABNORMAL
METHOD TYPE: SIGNIFICANT CHANGE UP
ORGANISM # SPEC MICROSCOPIC CNT: ABNORMAL
SPECIMEN SOURCE: SIGNIFICANT CHANGE UP

## 2024-11-15 ENCOUNTER — ASOB RESULT (OUTPATIENT)
Age: 24
End: 2024-11-15

## 2024-11-15 ENCOUNTER — APPOINTMENT (OUTPATIENT)
Dept: ANTEPARTUM | Facility: CLINIC | Age: 24
End: 2024-11-15
Payer: COMMERCIAL

## 2024-11-15 PROCEDURE — 76811 OB US DETAILED SNGL FETUS: CPT

## 2024-11-21 ENCOUNTER — APPOINTMENT (OUTPATIENT)
Dept: OBGYN | Facility: CLINIC | Age: 24
End: 2024-11-21
Payer: COMMERCIAL

## 2024-11-21 ENCOUNTER — NON-APPOINTMENT (OUTPATIENT)
Age: 24
End: 2024-11-21

## 2024-11-21 PROCEDURE — 0502F SUBSEQUENT PRENATAL CARE: CPT

## 2025-01-07 ENCOUNTER — APPOINTMENT (OUTPATIENT)
Dept: OBGYN | Facility: CLINIC | Age: 25
End: 2025-01-07
Payer: COMMERCIAL

## 2025-01-07 DIAGNOSIS — Z34.03 ENCOUNTER FOR SUPERVISION OF NORMAL FIRST PREGNANCY, THIRD TRIMESTER: ICD-10-CM

## 2025-01-07 DIAGNOSIS — Z23 ENCOUNTER FOR IMMUNIZATION: ICD-10-CM

## 2025-01-07 PROCEDURE — 36415 COLL VENOUS BLD VENIPUNCTURE: CPT

## 2025-01-07 PROCEDURE — 90471 IMMUNIZATION ADMIN: CPT

## 2025-01-07 PROCEDURE — 90715 TDAP VACCINE 7 YRS/> IM: CPT

## 2025-01-07 PROCEDURE — 0502F SUBSEQUENT PRENATAL CARE: CPT

## 2025-01-08 ENCOUNTER — NON-APPOINTMENT (OUTPATIENT)
Age: 25
End: 2025-01-08

## 2025-01-08 DIAGNOSIS — R73.02 IMPAIRED GLUCOSE TOLERANCE (ORAL): ICD-10-CM

## 2025-01-08 LAB
BASOPHILS # BLD AUTO: 0.03 K/UL
BASOPHILS NFR BLD AUTO: 0.4 %
BLD GP AB SCN SERPL QL: NORMAL
EOSINOPHIL # BLD AUTO: 0.14 K/UL
EOSINOPHIL NFR BLD AUTO: 1.9 %
GLUCOSE 1H P 50 G GLC PO SERPL-MCNC: 149 MG/DL
HCT VFR BLD CALC: 31.2 %
HGB BLD-MCNC: 9.6 G/DL
HIV1+2 AB SPEC QL IA.RAPID: NONREACTIVE
IMM GRANULOCYTES NFR BLD AUTO: 0.3 %
LYMPHOCYTES # BLD AUTO: 2.16 K/UL
LYMPHOCYTES NFR BLD AUTO: 29.8 %
MAN DIFF?: NORMAL
MCHC RBC-ENTMCNC: 24.8 PG
MCHC RBC-ENTMCNC: 30.8 G/DL
MCV RBC AUTO: 80.6 FL
MONOCYTES # BLD AUTO: 0.52 K/UL
MONOCYTES NFR BLD AUTO: 7.2 %
NEUTROPHILS # BLD AUTO: 4.39 K/UL
NEUTROPHILS NFR BLD AUTO: 60.4 %
PLATELET # BLD AUTO: 309 K/UL
RBC # BLD: 3.87 M/UL
RBC # FLD: 14.5 %
WBC # FLD AUTO: 7.26 K/UL

## 2025-01-10 ENCOUNTER — NON-APPOINTMENT (OUTPATIENT)
Age: 25
End: 2025-01-10

## 2025-01-10 LAB
25(OH)D3 SERPL-MCNC: 24.9 NG/ML
ALBUMIN SERPL ELPH-MCNC: 3.5 G/DL
ALP BLD-CCNC: 122 U/L
ALT SERPL-CCNC: 7 U/L
ANION GAP SERPL CALC-SCNC: 24 MMOL/L
AST SERPL-CCNC: 11 U/L
BILE AC SER-MCNC: 4.4 UMOL/L
BILIRUB SERPL-MCNC: <0.2 MG/DL
BUN SERPL-MCNC: 5 MG/DL
CALCIUM SERPL-MCNC: 8.8 MG/DL
CHLORIDE SERPL-SCNC: 103 MMOL/L
CO2 SERPL-SCNC: 15 MMOL/L
CREAT SERPL-MCNC: 0.63 MG/DL
EGFR: 127 ML/MIN/1.73M2
GLUCOSE SERPL-MCNC: 132 MG/DL
POTASSIUM SERPL-SCNC: 3.5 MMOL/L
PROT SERPL-MCNC: 6.7 G/DL
SODIUM SERPL-SCNC: 142 MMOL/L
T PALLIDUM AB SER QL IA: NEGATIVE

## 2025-01-12 ENCOUNTER — OUTPATIENT (OUTPATIENT)
Dept: INPATIENT UNIT | Facility: HOSPITAL | Age: 25
LOS: 1 days | End: 2025-01-12
Payer: COMMERCIAL

## 2025-01-12 VITALS — SYSTOLIC BLOOD PRESSURE: 105 MMHG | DIASTOLIC BLOOD PRESSURE: 60 MMHG | HEART RATE: 82 BPM

## 2025-01-12 VITALS
HEART RATE: 102 BPM | OXYGEN SATURATION: 98 % | RESPIRATION RATE: 18 BRPM | DIASTOLIC BLOOD PRESSURE: 69 MMHG | SYSTOLIC BLOOD PRESSURE: 113 MMHG | TEMPERATURE: 98 F

## 2025-01-12 DIAGNOSIS — O26.899 OTHER SPECIFIED PREGNANCY RELATED CONDITIONS, UNSPECIFIED TRIMESTER: ICD-10-CM

## 2025-01-12 DIAGNOSIS — Z98.89 OTHER SPECIFIED POSTPROCEDURAL STATES: Chronic | ICD-10-CM

## 2025-01-12 LAB
APPEARANCE UR: CLEAR — SIGNIFICANT CHANGE UP
BACTERIA # UR AUTO: ABNORMAL /HPF
BILIRUB UR-MCNC: NEGATIVE — SIGNIFICANT CHANGE UP
CAST: 0 /LPF — SIGNIFICANT CHANGE UP (ref 0–4)
COLOR SPEC: YELLOW — SIGNIFICANT CHANGE UP
DIFF PNL FLD: NEGATIVE — SIGNIFICANT CHANGE UP
GLUCOSE UR QL: NEGATIVE MG/DL — SIGNIFICANT CHANGE UP
KETONES UR-MCNC: ABNORMAL MG/DL
LEUKOCYTE ESTERASE UR-ACNC: ABNORMAL
NITRITE UR-MCNC: NEGATIVE — SIGNIFICANT CHANGE UP
PH UR: 6 — SIGNIFICANT CHANGE UP (ref 5–8)
PROT UR-MCNC: SIGNIFICANT CHANGE UP MG/DL
RBC CASTS # UR COMP ASSIST: 2 /HPF — SIGNIFICANT CHANGE UP (ref 0–4)
SP GR SPEC: 1.03 — SIGNIFICANT CHANGE UP (ref 1–1.03)
SQUAMOUS # UR AUTO: 8 /HPF — HIGH (ref 0–5)
UROBILINOGEN FLD QL: 1 MG/DL — SIGNIFICANT CHANGE UP (ref 0.2–1)
WBC UR QL: 2 /HPF — SIGNIFICANT CHANGE UP (ref 0–5)

## 2025-01-12 PROCEDURE — 99283 EMERGENCY DEPT VISIT LOW MDM: CPT

## 2025-01-12 PROCEDURE — G0463: CPT

## 2025-01-12 NOTE — OB PROVIDER TRIAGE NOTE - NS_LMP_OBGYN_ALL_OB_DT
[address] [facility],[address] [phone]       [facility] Emergency Department Emergency Medicine  If symptoms worsen - incontinence, bilateral leg numbness or weakness, numbness in groin [address] [facility]  AN ED, [address], [place], [place], [address]          3136 29-Jun-2024

## 2025-01-12 NOTE — OB PROVIDER TRIAGE NOTE - NSOBPROVIDERNOTE_OBGYN_ALL_OB_FT
Assessment  25yo  at 29w1d presents for bilateral hip pain, likely neurologic or muscular in nature, possibly sciatica. No concern for  labor or other OB etiologies.    Plan  - Rec gentle stretching, warm compresses, Tylenol PRN  - UA sent, will f/u UCx  - PT consult placed in AllScripts and script provided to patient  - Patient to follow-up with her OB this week and discuss possible PMR or Neuro consult    D/w attending Dr. Ronal Griffin, PGY3

## 2025-01-12 NOTE — OB PROVIDER TRIAGE NOTE - NSHPLABSRESULTS_GEN_ALL_CORE
Urinalysis Basic - ( 2025 14:41 )    Color: Yellow / Appearance: Clear / S.030 / pH: x  Gluc: x / Ketone: Trace mg/dL  / Bili: Negative / Urobili: 1.0 mg/dL   Blood: x / Protein: Trace mg/dL / Nitrite: Negative   Leuk Esterase: Trace / RBC: 2 /HPF / WBC 2 /HPF   Sq Epi: x / Non Sq Epi: 8 /HPF / Bacteria: Few /HPF

## 2025-01-12 NOTE — OB PROVIDER TRIAGE NOTE - HISTORY OF PRESENT ILLNESS
R3 L&D Triage    Subjective  HPI: 25yo  at 29w1d presents for bilateral hip pain. Patient reports the pain in her hips started approximately one week ago. She states the pain has worsened over the past week and is now worse on the right side. The pain now radiates down the side of her legs to her knees. Reports it is sharp, occasionally "pinching/numbing," and worse with movement. She has tried Tylenol with minimal relief. She called out of work this week due to the discomfort. She also reports brief episodes of cramping overnight and again this morning that self-resolved. +FM. -LOF. -CTXs. -VB. Pt denies any other concerns.    Name of OB: Dr. Middleton  PNC:     - GCT: 149 (), pending GTT     - s/p Tdap ()     - Anemia (H/H 9.6/31.2 on )  OBHx:  TOP w/ D&C, uncomplicated  GynHx: denies (hx chlamydia per chart review)  PMH: denies  PSH: s/p D&C, Tonsillectomy  Meds: PNV   Allergies: NKDA

## 2025-01-12 NOTE — OB PROVIDER TRIAGE NOTE - NSHPPHYSICALEXAM_GEN_ALL_CORE
Objective  Vital Signs Last 24 Hrs  T(C): 36.9 (12 Jan 2025 13:28), Max: 36.9 (12 Jan 2025 12:08)  T(F): 98.4 (12 Jan 2025 13:28), Max: 98.42 (12 Jan 2025 13:09)  HR: 82 (12 Jan 2025 15:32) (82 - 118)  BP: 105/60 (12 Jan 2025 15:32) (105/60 - 114/75)  BP(mean): --  RR: 18 (12 Jan 2025 13:28) (18 - 20)  SpO2: 93% (12 Jan 2025 13:54) (93% - 99%)    Parameters below as of 12 Jan 2025 13:28  Patient On (Oxygen Delivery Method): room air      PE:   CV: clinically well perfused  Pulm: breathing comfortably on RA  Abd: gravid, nontender to palpation, no firmness  Extr: moving all extremities with ease; able to move all ext equally       FHT: baseline 145, mod variability, +accels, -decels  Pyatt: acontractile  Sono: vertex, SADAF 4, +normal fetal movement  TVUS: CL 3-3.2 cm, no funneling or dynamic changes

## 2025-01-13 ENCOUNTER — NON-APPOINTMENT (OUTPATIENT)
Age: 25
End: 2025-01-13

## 2025-01-13 LAB
CULTURE RESULTS: SIGNIFICANT CHANGE UP
HBV CORE IGG+IGM SER QL: NONREACTIVE
HBV SURFACE AB SER QL: REACTIVE
HCV AB SER QL: NONREACTIVE
HCV S/CO RATIO: 0.16 S/CO
SPECIMEN SOURCE: SIGNIFICANT CHANGE UP

## 2025-01-14 ENCOUNTER — APPOINTMENT (OUTPATIENT)
Dept: OBGYN | Facility: CLINIC | Age: 25
End: 2025-01-14

## 2025-01-15 ENCOUNTER — NON-APPOINTMENT (OUTPATIENT)
Age: 25
End: 2025-01-15

## 2025-01-15 ENCOUNTER — APPOINTMENT (OUTPATIENT)
Dept: OBGYN | Facility: CLINIC | Age: 25
End: 2025-01-15
Payer: COMMERCIAL

## 2025-01-15 PROCEDURE — 36415 COLL VENOUS BLD VENIPUNCTURE: CPT

## 2025-01-17 DIAGNOSIS — O99.013 ANEMIA COMPLICATING PREGNANCY, THIRD TRIMESTER: ICD-10-CM

## 2025-01-17 DIAGNOSIS — M25.551 PAIN IN RIGHT HIP: ICD-10-CM

## 2025-01-17 DIAGNOSIS — D64.9 ANEMIA, UNSPECIFIED: ICD-10-CM

## 2025-01-17 DIAGNOSIS — Z3A.29 29 WEEKS GESTATION OF PREGNANCY: ICD-10-CM

## 2025-01-17 DIAGNOSIS — O99.891 OTHER SPECIFIED DISEASES AND CONDITIONS COMPLICATING PREGNANCY: ICD-10-CM

## 2025-01-17 DIAGNOSIS — M25.552 PAIN IN LEFT HIP: ICD-10-CM

## 2025-01-28 ENCOUNTER — NON-APPOINTMENT (OUTPATIENT)
Age: 25
End: 2025-01-28

## 2025-01-28 ENCOUNTER — TRANSCRIPTION ENCOUNTER (OUTPATIENT)
Age: 25
End: 2025-01-28

## 2025-01-28 DIAGNOSIS — Z34.02 ENCOUNTER FOR SUPERVISION OF NORMAL FIRST PREGNANCY, SECOND TRIMESTER: ICD-10-CM

## 2025-01-28 DIAGNOSIS — S13.9XXA SPRAIN OF JOINTS AND LIGAMENTS OF UNSPECIFIED PARTS OF NECK, INITIAL ENCOUNTER: ICD-10-CM

## 2025-01-28 DIAGNOSIS — Z34.91 ENCOUNTER FOR SUPERVISION OF NORMAL PREGNANCY, UNSPECIFIED, FIRST TRIMESTER: ICD-10-CM

## 2025-01-28 DIAGNOSIS — Z87.39 PERSONAL HISTORY OF OTHER DISEASES OF THE MUSCULOSKELETAL SYSTEM AND CONNECTIVE TISSUE: ICD-10-CM

## 2025-01-29 ENCOUNTER — APPOINTMENT (OUTPATIENT)
Dept: OBGYN | Facility: CLINIC | Age: 25
End: 2025-01-29
Payer: COMMERCIAL

## 2025-01-29 DIAGNOSIS — Z34.03 ENCOUNTER FOR SUPERVISION OF NORMAL FIRST PREGNANCY, THIRD TRIMESTER: ICD-10-CM

## 2025-01-29 DIAGNOSIS — R73.02 IMPAIRED GLUCOSE TOLERANCE (ORAL): ICD-10-CM

## 2025-01-29 PROCEDURE — 76816 OB US FOLLOW-UP PER FETUS: CPT

## 2025-01-29 PROCEDURE — 0502F SUBSEQUENT PRENATAL CARE: CPT

## 2025-01-29 RX ORDER — 70%ISOPROPYL ALCOHOL 0.7 ML/ML
70 SWAB TOPICAL
Qty: 1 | Refills: 2 | Status: ACTIVE | COMMUNITY
Start: 2025-01-29 | End: 1900-01-01

## 2025-01-29 RX ORDER — CONTAINER,EMPTY
EACH MISCELLANEOUS
Qty: 1 | Refills: 0 | Status: ACTIVE | COMMUNITY
Start: 2025-01-29 | End: 1900-01-01

## 2025-01-29 RX ORDER — BLOOD SUGAR DIAGNOSTIC
STRIP MISCELLANEOUS 4 TIMES DAILY
Qty: 1 | Refills: 0 | Status: ACTIVE | COMMUNITY
Start: 2025-01-28 | End: 1900-01-01

## 2025-01-29 RX ORDER — BLOOD-GLUCOSE METER
W/DEVICE EACH MISCELLANEOUS
Qty: 1 | Refills: 0 | Status: ACTIVE | COMMUNITY
Start: 2025-01-28 | End: 1900-01-01

## 2025-01-29 RX ORDER — LANCETS
EACH MISCELLANEOUS
Qty: 1 | Refills: 0 | Status: ACTIVE | COMMUNITY
Start: 2025-01-28 | End: 1900-01-01

## 2025-01-30 ENCOUNTER — APPOINTMENT (OUTPATIENT)
Dept: CARDIOLOGY | Facility: CLINIC | Age: 25
End: 2025-01-30
Payer: COMMERCIAL

## 2025-01-30 ENCOUNTER — NON-APPOINTMENT (OUTPATIENT)
Age: 25
End: 2025-01-30

## 2025-01-30 ENCOUNTER — APPOINTMENT (OUTPATIENT)
Dept: ELECTROPHYSIOLOGY | Facility: CLINIC | Age: 25
End: 2025-01-30

## 2025-01-30 VITALS
HEART RATE: 101 BPM | DIASTOLIC BLOOD PRESSURE: 76 MMHG | HEIGHT: 65 IN | SYSTOLIC BLOOD PRESSURE: 111 MMHG | OXYGEN SATURATION: 98 % | WEIGHT: 226 LBS | BODY MASS INDEX: 37.65 KG/M2

## 2025-01-30 DIAGNOSIS — R00.0 TACHYCARDIA, UNSPECIFIED: ICD-10-CM

## 2025-01-30 DIAGNOSIS — Z34.03 ENCOUNTER FOR SUPERVISION OF NORMAL FIRST PREGNANCY, THIRD TRIMESTER: ICD-10-CM

## 2025-01-30 DIAGNOSIS — R00.2 PALPITATIONS: ICD-10-CM

## 2025-01-30 PROCEDURE — 99203 OFFICE O/P NEW LOW 30 MIN: CPT | Mod: 25

## 2025-01-30 PROCEDURE — 93000 ELECTROCARDIOGRAM COMPLETE: CPT

## 2025-01-30 RX ORDER — PRENATAL VIT NO.130/IRON/FOLIC 27MG-0.8MG
27-0.8 TABLET ORAL
Qty: 30 | Refills: 5 | Status: ACTIVE | COMMUNITY
Start: 2025-01-30

## 2025-01-31 ENCOUNTER — EMERGENCY (EMERGENCY)
Facility: HOSPITAL | Age: 25
LOS: 1 days | End: 2025-01-31
Admitting: EMERGENCY MEDICINE
Payer: COMMERCIAL

## 2025-01-31 VITALS
RESPIRATION RATE: 16 BRPM | OXYGEN SATURATION: 100 % | HEIGHT: 64 IN | WEIGHT: 225.97 LBS | DIASTOLIC BLOOD PRESSURE: 74 MMHG | TEMPERATURE: 98 F | SYSTOLIC BLOOD PRESSURE: 108 MMHG | HEART RATE: 93 BPM

## 2025-01-31 DIAGNOSIS — Z98.89 OTHER SPECIFIED POSTPROCEDURAL STATES: Chronic | ICD-10-CM

## 2025-01-31 PROCEDURE — L9991: CPT

## 2025-01-31 NOTE — ED ADULT TRIAGE NOTE - CHIEF COMPLAINT QUOTE
palpitations x 1 week, had a halter monitor placed yesterday, states she started feeling palpitations worse this AM. endorsing chest tightness/SOB  32 weeks pregnant,

## 2025-02-03 ENCOUNTER — NON-APPOINTMENT (OUTPATIENT)
Age: 25
End: 2025-02-03

## 2025-02-06 ENCOUNTER — APPOINTMENT (OUTPATIENT)
Dept: CARDIOLOGY | Facility: CLINIC | Age: 25
End: 2025-02-06

## 2025-02-13 ENCOUNTER — APPOINTMENT (OUTPATIENT)
Dept: OBGYN | Facility: CLINIC | Age: 25
End: 2025-02-13
Payer: COMMERCIAL

## 2025-02-13 ENCOUNTER — ASOB RESULT (OUTPATIENT)
Age: 25
End: 2025-02-13

## 2025-02-13 ENCOUNTER — APPOINTMENT (OUTPATIENT)
Dept: OBGYN | Facility: CLINIC | Age: 25
End: 2025-02-13

## 2025-02-13 PROCEDURE — 76816 OB US FOLLOW-UP PER FETUS: CPT

## 2025-02-13 PROCEDURE — 76819 FETAL BIOPHYS PROFIL W/O NST: CPT | Mod: 59

## 2025-02-13 PROCEDURE — 0502F SUBSEQUENT PRENATAL CARE: CPT

## 2025-02-14 ENCOUNTER — NON-APPOINTMENT (OUTPATIENT)
Age: 25
End: 2025-02-14

## 2025-02-18 ENCOUNTER — NON-APPOINTMENT (OUTPATIENT)
Age: 25
End: 2025-02-18

## 2025-02-18 PROCEDURE — 93244 EXT ECG>48HR<7D REV&INTERPJ: CPT

## 2025-02-26 ENCOUNTER — APPOINTMENT (OUTPATIENT)
Dept: OBGYN | Facility: CLINIC | Age: 25
End: 2025-02-26

## 2025-02-26 ENCOUNTER — APPOINTMENT (OUTPATIENT)
Dept: OBGYN | Facility: CLINIC | Age: 25
End: 2025-02-26
Payer: COMMERCIAL

## 2025-02-26 PROCEDURE — 0502F SUBSEQUENT PRENATAL CARE: CPT

## 2025-02-27 ENCOUNTER — APPOINTMENT (OUTPATIENT)
Dept: CARDIOLOGY | Facility: CLINIC | Age: 25
End: 2025-02-27
Payer: COMMERCIAL

## 2025-02-27 VITALS
DIASTOLIC BLOOD PRESSURE: 74 MMHG | SYSTOLIC BLOOD PRESSURE: 108 MMHG | OXYGEN SATURATION: 98 % | HEART RATE: 102 BPM | WEIGHT: 224 LBS

## 2025-02-27 DIAGNOSIS — R00.2 PALPITATIONS: ICD-10-CM

## 2025-02-27 DIAGNOSIS — R06.00 DYSPNEA, UNSPECIFIED: ICD-10-CM

## 2025-02-27 PROCEDURE — G2211 COMPLEX E/M VISIT ADD ON: CPT | Mod: NC

## 2025-02-27 PROCEDURE — 93000 ELECTROCARDIOGRAM COMPLETE: CPT

## 2025-02-27 PROCEDURE — 99214 OFFICE O/P EST MOD 30 MIN: CPT

## 2025-03-05 ENCOUNTER — NON-APPOINTMENT (OUTPATIENT)
Age: 25
End: 2025-03-05

## 2025-03-06 ENCOUNTER — APPOINTMENT (OUTPATIENT)
Dept: OBGYN | Facility: CLINIC | Age: 25
End: 2025-03-06
Payer: COMMERCIAL

## 2025-03-06 PROCEDURE — 0502F SUBSEQUENT PRENATAL CARE: CPT

## 2025-03-07 LAB
HIV1+2 AB SPEC QL IA.RAPID: NONREACTIVE
T PALLIDUM AB SER QL IA: NEGATIVE

## 2025-03-08 ENCOUNTER — APPOINTMENT (OUTPATIENT)
Dept: CARDIOLOGY | Facility: CLINIC | Age: 25
End: 2025-03-08

## 2025-03-08 PROCEDURE — 93306 TTE W/DOPPLER COMPLETE: CPT

## 2025-03-10 ENCOUNTER — NON-APPOINTMENT (OUTPATIENT)
Age: 25
End: 2025-03-10

## 2025-03-10 LAB
GP B STREP DNA SPEC QL NAA+PROBE: DETECTED
SOURCE GBS: NORMAL

## 2025-03-11 ENCOUNTER — NON-APPOINTMENT (OUTPATIENT)
Age: 25
End: 2025-03-11

## 2025-03-12 ENCOUNTER — ASOB RESULT (OUTPATIENT)
Age: 25
End: 2025-03-12

## 2025-03-12 ENCOUNTER — APPOINTMENT (OUTPATIENT)
Dept: OBGYN | Facility: CLINIC | Age: 25
End: 2025-03-12
Payer: COMMERCIAL

## 2025-03-12 VITALS — DIASTOLIC BLOOD PRESSURE: 76 MMHG | WEIGHT: 228 LBS | SYSTOLIC BLOOD PRESSURE: 113 MMHG

## 2025-03-12 DIAGNOSIS — Z34.03 ENCOUNTER FOR SUPERVISION OF NORMAL FIRST PREGNANCY, THIRD TRIMESTER: ICD-10-CM

## 2025-03-12 PROCEDURE — 76816 OB US FOLLOW-UP PER FETUS: CPT

## 2025-03-12 PROCEDURE — 76819 FETAL BIOPHYS PROFIL W/O NST: CPT | Mod: 59

## 2025-03-12 PROCEDURE — 0502F SUBSEQUENT PRENATAL CARE: CPT

## 2025-03-19 ENCOUNTER — NON-APPOINTMENT (OUTPATIENT)
Age: 25
End: 2025-03-19

## 2025-03-19 ENCOUNTER — APPOINTMENT (OUTPATIENT)
Dept: OBGYN | Facility: CLINIC | Age: 25
End: 2025-03-19
Payer: COMMERCIAL

## 2025-03-19 PROCEDURE — 0502F SUBSEQUENT PRENATAL CARE: CPT

## 2025-03-26 ENCOUNTER — TRANSCRIPTION ENCOUNTER (OUTPATIENT)
Age: 25
End: 2025-03-26

## 2025-03-27 ENCOUNTER — APPOINTMENT (OUTPATIENT)
Dept: OBGYN | Facility: CLINIC | Age: 25
End: 2025-03-27
Payer: COMMERCIAL

## 2025-03-27 PROCEDURE — 0502F SUBSEQUENT PRENATAL CARE: CPT

## 2025-03-30 ENCOUNTER — INPATIENT (INPATIENT)
Facility: HOSPITAL | Age: 25
LOS: 1 days | Discharge: HOME | DRG: 951 | End: 2025-04-01
Attending: OBSTETRICS & GYNECOLOGY | Admitting: OBSTETRICS & GYNECOLOGY
Payer: COMMERCIAL

## 2025-03-30 VITALS
HEART RATE: 101 BPM | TEMPERATURE: 98 F | DIASTOLIC BLOOD PRESSURE: 87 MMHG | OXYGEN SATURATION: 100 % | SYSTOLIC BLOOD PRESSURE: 123 MMHG | RESPIRATION RATE: 18 BRPM

## 2025-03-30 DIAGNOSIS — Z98.89 OTHER SPECIFIED POSTPROCEDURAL STATES: Chronic | ICD-10-CM

## 2025-03-30 DIAGNOSIS — O26.899 OTHER SPECIFIED PREGNANCY RELATED CONDITIONS, UNSPECIFIED TRIMESTER: ICD-10-CM

## 2025-03-30 DIAGNOSIS — Z34.80 ENCOUNTER FOR SUPERVISION OF OTHER NORMAL PREGNANCY, UNSPECIFIED TRIMESTER: ICD-10-CM

## 2025-03-30 LAB
ADD ON TEST-SPECIMEN IN LAB: SIGNIFICANT CHANGE UP
ADD ON TEST-SPECIMEN IN LAB: SIGNIFICANT CHANGE UP
BASOPHILS # BLD AUTO: 0 K/UL — SIGNIFICANT CHANGE UP (ref 0–0.2)
BASOPHILS NFR BLD AUTO: 0 % — SIGNIFICANT CHANGE UP (ref 0–2)
BLD GP AB SCN SERPL QL: NEGATIVE — SIGNIFICANT CHANGE UP
BURR CELLS BLD QL SMEAR: SLIGHT — SIGNIFICANT CHANGE UP
ELLIPTOCYTES BLD QL SMEAR: SLIGHT — SIGNIFICANT CHANGE UP
EOSINOPHIL # BLD AUTO: 0.08 K/UL — SIGNIFICANT CHANGE UP (ref 0–0.5)
EOSINOPHIL NFR BLD AUTO: 0.9 % — SIGNIFICANT CHANGE UP (ref 0–6)
GIANT PLATELETS BLD QL SMEAR: PRESENT — SIGNIFICANT CHANGE UP
HBV SURFACE AG SER-ACNC: SIGNIFICANT CHANGE UP
HCT VFR BLD CALC: 30.3 % — LOW (ref 34.5–45)
HGB BLD-MCNC: 9.5 G/DL — LOW (ref 11.5–15.5)
LYMPHOCYTES # BLD AUTO: 2.73 K/UL — SIGNIFICANT CHANGE UP (ref 1–3.3)
LYMPHOCYTES # BLD AUTO: 32.2 % — SIGNIFICANT CHANGE UP (ref 13–44)
MANUAL SMEAR VERIFICATION: SIGNIFICANT CHANGE UP
MCHC RBC-ENTMCNC: 23.3 PG — LOW (ref 27–34)
MCHC RBC-ENTMCNC: 31.4 G/DL — LOW (ref 32–36)
MCV RBC AUTO: 74.4 FL — LOW (ref 80–100)
MONOCYTES # BLD AUTO: 0.66 K/UL — SIGNIFICANT CHANGE UP (ref 0–0.9)
MONOCYTES NFR BLD AUTO: 7.8 % — SIGNIFICANT CHANGE UP (ref 2–14)
NEUTROPHILS # BLD AUTO: 5.02 K/UL — SIGNIFICANT CHANGE UP (ref 1.8–7.4)
NEUTROPHILS NFR BLD AUTO: 59.1 % — SIGNIFICANT CHANGE UP (ref 43–77)
PLAT MORPH BLD: NORMAL — SIGNIFICANT CHANGE UP
PLATELET # BLD AUTO: 289 K/UL — SIGNIFICANT CHANGE UP (ref 150–400)
POIKILOCYTOSIS BLD QL AUTO: SLIGHT — SIGNIFICANT CHANGE UP
RBC # BLD: 4.07 M/UL — SIGNIFICANT CHANGE UP (ref 3.8–5.2)
RBC # FLD: 16.3 % — HIGH (ref 10.3–14.5)
RBC BLD AUTO: ABNORMAL
RH IG SCN BLD-IMP: POSITIVE — SIGNIFICANT CHANGE UP
WBC # BLD: 8.49 K/UL — SIGNIFICANT CHANGE UP (ref 3.8–10.5)
WBC # FLD AUTO: 8.49 K/UL — SIGNIFICANT CHANGE UP (ref 3.8–10.5)

## 2025-03-30 PROCEDURE — 59400 OBSTETRICAL CARE: CPT

## 2025-03-30 RX ORDER — CITRIC ACID/SODIUM CITRATE 300-500 MG
15 SOLUTION, ORAL ORAL EVERY 6 HOURS
Refills: 0 | Status: DISCONTINUED | OUTPATIENT
Start: 2025-03-30 | End: 2025-03-31

## 2025-03-30 RX ORDER — MODIFIED LANOLIN 100 %
1 CREAM (GRAM) TOPICAL EVERY 6 HOURS
Refills: 0 | Status: DISCONTINUED | OUTPATIENT
Start: 2025-03-30 | End: 2025-04-01

## 2025-03-30 RX ORDER — ONDANSETRON HCL/PF 4 MG/2 ML
4 VIAL (ML) INJECTION ONCE
Refills: 0 | Status: COMPLETED | OUTPATIENT
Start: 2025-03-30 | End: 2025-03-30

## 2025-03-30 RX ORDER — MAGNESIUM HYDROXIDE 400 MG/5ML
30 SUSPENSION ORAL
Refills: 0 | Status: DISCONTINUED | OUTPATIENT
Start: 2025-03-30 | End: 2025-04-01

## 2025-03-30 RX ORDER — WITCH HAZEL LEAF
1 FLUID EXTRACT MISCELLANEOUS EVERY 4 HOURS
Refills: 0 | Status: DISCONTINUED | OUTPATIENT
Start: 2025-03-30 | End: 2025-04-01

## 2025-03-30 RX ORDER — KETOROLAC TROMETHAMINE 30 MG/ML
30 INJECTION, SOLUTION INTRAMUSCULAR; INTRAVENOUS ONCE
Refills: 0 | Status: DISCONTINUED | OUTPATIENT
Start: 2025-03-30 | End: 2025-03-31

## 2025-03-30 RX ORDER — SODIUM CHLORIDE 9 G/1000ML
1000 INJECTION, SOLUTION INTRAVENOUS ONCE
Refills: 0 | Status: COMPLETED | OUTPATIENT
Start: 2025-03-30 | End: 2025-03-30

## 2025-03-30 RX ORDER — SIMETHICONE 80 MG
80 TABLET,CHEWABLE ORAL EVERY 4 HOURS
Refills: 0 | Status: DISCONTINUED | OUTPATIENT
Start: 2025-03-30 | End: 2025-04-01

## 2025-03-30 RX ORDER — PRENATAL 136/IRON/FOLIC ACID 27 MG-1 MG
1 TABLET ORAL DAILY
Refills: 0 | Status: DISCONTINUED | OUTPATIENT
Start: 2025-03-30 | End: 2025-04-01

## 2025-03-30 RX ORDER — OXYTOCIN-SODIUM CHLORIDE 0.9% IV SOLN 30 UNIT/500ML 30-0.9/5 UT/ML-%
10 SOLUTION INTRAVENOUS ONCE
Refills: 0 | Status: DISCONTINUED | OUTPATIENT
Start: 2025-03-30 | End: 2025-03-30

## 2025-03-30 RX ORDER — AMPICILLIN SODIUM 1 G/1
1 INJECTION, POWDER, FOR SOLUTION INTRAMUSCULAR; INTRAVENOUS EVERY 4 HOURS
Refills: 0 | Status: DISCONTINUED | OUTPATIENT
Start: 2025-03-30 | End: 2025-03-31

## 2025-03-30 RX ORDER — SODIUM CHLORIDE 9 G/1000ML
1000 INJECTION, SOLUTION INTRAVENOUS
Refills: 0 | Status: DISCONTINUED | OUTPATIENT
Start: 2025-03-30 | End: 2025-03-31

## 2025-03-30 RX ORDER — IBUPROFEN 200 MG
600 TABLET ORAL EVERY 6 HOURS
Refills: 0 | Status: COMPLETED | OUTPATIENT
Start: 2025-03-30 | End: 2026-02-26

## 2025-03-30 RX ORDER — DIBUCAINE 10 MG/G
1 OINTMENT TOPICAL EVERY 6 HOURS
Refills: 0 | Status: DISCONTINUED | OUTPATIENT
Start: 2025-03-30 | End: 2025-04-01

## 2025-03-30 RX ORDER — HYDROCORTISONE 10 MG/G
1 CREAM TOPICAL EVERY 6 HOURS
Refills: 0 | Status: DISCONTINUED | OUTPATIENT
Start: 2025-03-30 | End: 2025-04-01

## 2025-03-30 RX ORDER — OXYCODONE HYDROCHLORIDE 30 MG/1
5 TABLET ORAL ONCE
Refills: 0 | Status: DISCONTINUED | OUTPATIENT
Start: 2025-03-30 | End: 2025-04-01

## 2025-03-30 RX ORDER — PRAMOXINE HCL 1 %
1 GEL (GRAM) TOPICAL EVERY 4 HOURS
Refills: 0 | Status: DISCONTINUED | OUTPATIENT
Start: 2025-03-30 | End: 2025-04-01

## 2025-03-30 RX ORDER — OXYCODONE HYDROCHLORIDE 30 MG/1
5 TABLET ORAL
Refills: 0 | Status: DISCONTINUED | OUTPATIENT
Start: 2025-03-30 | End: 2025-04-01

## 2025-03-30 RX ORDER — ACETAMINOPHEN 500 MG/5ML
975 LIQUID (ML) ORAL
Refills: 0 | Status: DISCONTINUED | OUTPATIENT
Start: 2025-03-30 | End: 2025-04-01

## 2025-03-30 RX ORDER — AMPICILLIN SODIUM 1 G/1
2 INJECTION, POWDER, FOR SOLUTION INTRAMUSCULAR; INTRAVENOUS ONCE
Refills: 0 | Status: COMPLETED | OUTPATIENT
Start: 2025-03-30 | End: 2025-03-30

## 2025-03-30 RX ORDER — OXYTOCIN-SODIUM CHLORIDE 0.9% IV SOLN 30 UNIT/500ML 30-0.9/5 UT/ML-%
10 SOLUTION INTRAVENOUS ONCE
Refills: 0 | Status: COMPLETED | OUTPATIENT
Start: 2025-03-30 | End: 2025-03-30

## 2025-03-30 RX ORDER — OXYTOCIN-SODIUM CHLORIDE 0.9% IV SOLN 30 UNIT/500ML 30-0.9/5 UT/ML-%
4 SOLUTION INTRAVENOUS
Qty: 30 | Refills: 0 | Status: DISCONTINUED | OUTPATIENT
Start: 2025-03-30 | End: 2025-04-01

## 2025-03-30 RX ORDER — DIPHENHYDRAMINE HCL 12.5MG/5ML
25 ELIXIR ORAL EVERY 6 HOURS
Refills: 0 | Status: DISCONTINUED | OUTPATIENT
Start: 2025-03-30 | End: 2025-04-01

## 2025-03-30 RX ORDER — OXYTOCIN-SODIUM CHLORIDE 0.9% IV SOLN 30 UNIT/500ML 30-0.9/5 UT/ML-%
167 SOLUTION INTRAVENOUS
Qty: 30 | Refills: 0 | Status: DISCONTINUED | OUTPATIENT
Start: 2025-03-30 | End: 2025-04-01

## 2025-03-30 RX ORDER — OXYTOCIN-SODIUM CHLORIDE 0.9% IV SOLN 30 UNIT/500ML 30-0.9/5 UT/ML-%
333 SOLUTION INTRAVENOUS
Qty: 30 | Refills: 0 | Status: DISCONTINUED | OUTPATIENT
Start: 2025-03-30 | End: 2025-04-01

## 2025-03-30 RX ORDER — CLOSTRIDIUM TETANI TOXOID ANTIGEN (FORMALDEHYDE INACTIVATED), CORYNEBACTERIUM DIPHTHERIAE TOXOID ANTIGEN (FORMALDEHYDE INACTIVATED), BORDETELLA PERTUSSIS TOXOID ANTIGEN (GLUTARALDEHYDE INACTIVATED), BORDETELLA PERTUSSIS FILAMENTOUS HEMAGGLUTININ ANTIGEN (FORMALDEHYDE INACTIVATED), BORDETELLA PERTUSSIS PERTACTIN ANTIGEN, AND BORDETELLA PERTUSSIS FIMBRIAE 2/3 ANTIGEN 5; 2; 2.5; 5; 3; 5 [LF]/.5ML; [LF]/.5ML; UG/.5ML; UG/.5ML; UG/.5ML; UG/.5ML
0.5 INJECTION, SUSPENSION INTRAMUSCULAR ONCE
Refills: 0 | Status: DISCONTINUED | OUTPATIENT
Start: 2025-03-30 | End: 2025-04-01

## 2025-03-30 RX ORDER — BENZOCAINE 220 MG/G
1 SPRAY, METERED PERIODONTAL EVERY 6 HOURS
Refills: 0 | Status: DISCONTINUED | OUTPATIENT
Start: 2025-03-30 | End: 2025-04-01

## 2025-03-30 RX ADMIN — SODIUM CHLORIDE 125 MILLILITER(S): 9 INJECTION, SOLUTION INTRAVENOUS at 08:04

## 2025-03-30 RX ADMIN — AMPICILLIN SODIUM 100 GRAM(S): 1 INJECTION, POWDER, FOR SOLUTION INTRAMUSCULAR; INTRAVENOUS at 16:34

## 2025-03-30 RX ADMIN — AMPICILLIN SODIUM 100 GRAM(S): 1 INJECTION, POWDER, FOR SOLUTION INTRAMUSCULAR; INTRAVENOUS at 20:17

## 2025-03-30 RX ADMIN — OXYTOCIN-SODIUM CHLORIDE 0.9% IV SOLN 30 UNIT/500ML 10 UNIT(S): 30-0.9/5 SOLUTION at 22:53

## 2025-03-30 RX ADMIN — OXYTOCIN-SODIUM CHLORIDE 0.9% IV SOLN 30 UNIT/500ML 4 MILLIUNIT(S)/MIN: 30-0.9/5 SOLUTION at 11:42

## 2025-03-30 RX ADMIN — SODIUM CHLORIDE 1000 MILLILITER(S): 9 INJECTION, SOLUTION INTRAVENOUS at 15:19

## 2025-03-30 RX ADMIN — Medication 4 MILLIGRAM(S): at 21:14

## 2025-03-30 RX ADMIN — SODIUM CHLORIDE 125 MILLILITER(S): 9 INJECTION, SOLUTION INTRAVENOUS at 18:10

## 2025-03-30 RX ADMIN — AMPICILLIN SODIUM 100 GRAM(S): 1 INJECTION, POWDER, FOR SOLUTION INTRAMUSCULAR; INTRAVENOUS at 12:06

## 2025-03-30 RX ADMIN — AMPICILLIN SODIUM 200 GRAM(S): 1 INJECTION, POWDER, FOR SOLUTION INTRAMUSCULAR; INTRAVENOUS at 08:05

## 2025-03-30 RX ADMIN — Medication 600 MILLILITER(S): at 18:43

## 2025-03-30 RX ADMIN — SODIUM CHLORIDE 125 MILLILITER(S): 9 INJECTION, SOLUTION INTRAVENOUS at 08:05

## 2025-03-30 NOTE — OB PROVIDER H&P - HISTORY OF PRESENT ILLNESS
R4 H&P    Pt is a 23y/o  at 40+1 presents with leakage of fluid since 2a and intermittent contractions. Endorses good FM. Denies VB  Prenatal course c/b...  1. palpitations  -s/p cardio ob consult  -s/p zio patch wnl  GBS pos   EFW 3400    OBHx: TOP x1 w D+C  GynHx: Denies cysts, fibroids, abnormal paps, STIs  PMHx: Denies  PSHx: Tonsillectomy, D+C  Med: PNV  All: NKDA  SH: Denies toxic habits x3

## 2025-03-30 NOTE — OB RN PATIENT PROFILE - FALL HARM RISK - UNIVERSAL INTERVENTIONS
Bed in lowest position, wheels locked, appropriate side rails in place/Call bell, personal items and telephone in reach/Instruct patient to call for assistance before getting out of bed or chair/Non-slip footwear when patient is out of bed/[employer] to call system/Physically safe environment - no spills, clutter or unnecessary equipment/Purposeful Proactive Rounding/Room/bathroom lighting operational, light cord in reach

## 2025-03-30 NOTE — OB RN DELIVERY SUMMARY - NS_SEPSISRSKCALC_OBGYN_ALL_OB_FT
EOS calculated successfully. EOS Risk Factor: 0.5/1000 live births ([facility] national incidence); GA=40w1d; Temp=99.68; ROM=8.867; GBS='Positive'; Antibiotics='GBS specific antibiotics > 2 hrs prior to birth'

## 2025-03-30 NOTE — OB RN DELIVERY SUMMARY - NS_CORDBLDREASONA_OBGYN_ALL_OB
Quality 402: Tobacco Use And Help With Quitting Among Adolescents: Patient screened for tobacco and never smoked
Detail Level: Detailed
Quality 130: Documentation Of Current Medications In The Medical Record: Current Medications with Name, Dosage, Frequency, or Route not Documented, Reason not Given
Mother is RH Positive

## 2025-03-30 NOTE — OB PROVIDER H&P - NSOBPROC_OBGYN_ALL_OB
"Subjective:      Patient ID: Nathalie Membreno is a 75 y.o. female.    Chief Complaint: Follow-up (4 month )      HPI  Here for follow up of medical problems.  Chest keloid itches.  RLS better, but sometimes breakthrough.  BMs good with miralax.  No f/c/sw/cough.  No cp/sob/palp.  Stable on heart meds.    Updated/ annual due 1/23:  HM: 10/21 fluvax, 3/21 covid vaccines, 6/19 HAV, 6/15 [id number], 11/16 booster [id number], 9/14 TDaP, 5/18 BMD rep 5y, 3/21 Cscope rep 3y, 1/22 MMG, 10/20 Gyn Dr. Holden, 7/20 Eye Dr. Fiore, 11/16 HCV neg, Card [facility] Neal [facility].     Review of Systems   Constitutional: Negative for chills, diaphoresis and fever.   Respiratory: Negative for cough and shortness of breath.    Cardiovascular: Negative for chest pain, palpitations and leg swelling.   Gastrointestinal: Negative for blood in stool, constipation, diarrhea, nausea and vomiting.   Genitourinary: Negative for dysuria, frequency and hematuria.   Psychiatric/Behavioral: The patient is not nervous/anxious.          Objective:   /78   Pulse 60   Temp 97.6 °F (36.4 °C)   Ht 5' 4" (1.626 m)   Wt 102.3 kg (225 lb 10.3 oz)   SpO2 96%   BMI 38.73 kg/m²     Physical Exam  Constitutional:       Appearance: She is well-developed.   Neck:      Thyroid: No thyroid mass.      Vascular: No carotid bruit.   Cardiovascular:      Rate and Rhythm: Normal rate and regular rhythm.      Heart sounds: No murmur heard.    No friction rub. No gallop.   Pulmonary:      Effort: Pulmonary effort is normal.      Breath sounds: Normal breath sounds. No wheezing or rales.   Abdominal:      General: Bowel sounds are normal.      Palpations: Abdomen is soft. There is no mass.      Tenderness: There is no abdominal tenderness.   Musculoskeletal:      Cervical back: Neck supple.   Lymphadenopathy:      Cervical: No cervical adenopathy.   Neurological:      Mental Status: She is alert and oriented to person, place, and time. "             Assessment:       1. Essential hypertension    2. Mild intermittent asthma without complication    3. RLS (restless legs syndrome)    4. Atrial flutter with rapid ventricular response    5. History of TIA (transient ischemic attack)    6. Slow transit constipation          Plan:     Essential hypertension- stable, cont rx.    Mild intermittent asthma without complication- albuterol prn working well, less than once monthly.    RLS (restless legs syndrome)- increase dose.  RTC  4mo.  -     rOPINIRole (REQUIP) 0.5 MG tablet; Take 3 tablets (1.5 mg total) by mouth every evening.  Dispense: 90 tablet; Refill: 11    Atrial flutter with rapid ventricular response- stable on meds, per Cards.    History of TIA (transient ischemic attack)    Slow transit constipation- doing well, cont mirlax.    HC 1% for keloid scar, Sarna lotion if not better.         Unknown at This Time

## 2025-03-30 NOTE — OB PROVIDER LABOR PROGRESS NOTE - ASSESSMENT
pitocin held  position changed  close observation
IUPC replaced  Continuous maternal/fetal monitoring  Cat 2 fht  Continue repositioning and resuscitation efforts    d/w Dr. Rolando Bond PGY3
cont close observation
cont close observation

## 2025-03-30 NOTE — PRE-ANESTHESIA EVALUATION ADULT - NSANTHOSAYNRD_GEN_A_CORE
No. FÉLIX screening performed.  STOP BANG Legend: 0-2 = LOW Risk; 3-4 = INTERMEDIATE Risk; 5-8 = HIGH Risk

## 2025-03-30 NOTE — OB PROVIDER H&P - NSHPPHYSICALEXAM_GEN_ALL_CORE
VS:  Vital Signs Last 24 Hrs  T(C): 36.9 (30 Mar 2025 07:00), Max: 36.9 (30 Mar 2025 06:18)  T(F): 98.4 (30 Mar 2025 07:00), Max: 98.42 (30 Mar 2025 06:25)  HR: 77 (30 Mar 2025 07:14) (77 - 101)  BP: 123/87 (30 Mar 2025 07:00) (123/84 - 123/87)  BP(mean): --  RR: 20 (30 Mar 2025 07:00) (18 - 20)  SpO2: 98% (30 Mar 2025 07:14) (97% - 100%)    Parameters below as of 30 Mar 2025 07:00  Patient On (Oxygen Delivery Method): room air      GA: NAD  Cards: RRR  Pulm: CTAB  EFH: reactive and reassuring  [place]: irregular  VE: 2/60/-3  TAUS: vertex

## 2025-03-30 NOTE — OB NEONATOLOGY/PEDIATRICIAN DELIVERY SUMMARY - NSPEDSNEONOTESA_OBGYN_ALL_OB_FT
Peds requested at delivery for cat II tracing. 40.1wk male, AGA, born 3/30 (22:47) via  to a 25 y/o  blood type A+ mother. Maternal history of anemia, TOP x1 w/ D&C. No significant prenatal history. PNL nr/immune/-, GBS positive on 3/6, received Amp x4. AROM at 3/30 (13:55) with meconium fluids. Mom would like to breastfeed, declines Hep B and consents circ. Tmax 37.6, EOS 0.21. Mother did not receive RSV. Admitted to NBN, parents updated. Peds requested at delivery for cat II tracing. 40.1wk male, AGA, born 3/30 (22:47) via  to a 25 y/o  blood type A+ mother. Maternal history of anemia, TOP x1 w/ D&C. No significant prenatal history. PNL nr/immune/-, GBS positive on 3/6, received Amp x4. AROM at 3/30 (13:55) with meconium fluids.  Baby received routine resuscitation with APGARS of 8/9 (color). Nuchal x1. Mom would like to breastfeed, declines Hep B and consents circ. Tmax 37.6, EOS 0.21. Mother did not receive RSV. Admitted to NBN, parents updated.

## 2025-03-30 NOTE — OB PROVIDER H&P - ASSESSMENT
A&P: 24 year old  at 40+1 a/w ROM since 2a. No other complaints  Labor: admit to L&D   -4 VC  -routine labs  -EFM/toco  -NPO, IV hydration  Fetal: cat 1 tracing, fetal status reassuring  GBS: pos  Analgesia: PRN       d/w Dr Rolando Crowder PGY4

## 2025-03-30 NOTE — OB PROVIDER LABOR PROGRESS NOTE - NS_OBIHIFHRDETAILS_OBGYN_ALL_OB_FT
130bpm, mod, -accels, +variable decels
categ 1
category 1
fetal heart bradycardia x 3min with return to baseline

## 2025-03-30 NOTE — OB RN DELIVERY SUMMARY - NSSELHIDDEN_OBGYN_ALL_OB_FT
[NS_DeliveryAttending1_OBGYN_ALL_OB_FT:[id number]],[NS_DeliveryRN_OBGYN_ALL_OB_FT:[id number]]

## 2025-03-30 NOTE — OB PROVIDER LABOR PROGRESS NOTE - NS_SUBJECTIVE/OBJECTIVE_OBGYN_ALL_OB_FT
pt c/o mild pressure
pt comfortable
c/o pressure
Pt seen for placement of IUPC. Forebag ruptured and IUPC placed.    LCavalieri PAC
Patient seen to replace IUPC due to IUPC dislodged  IUPC replaced without difficulty

## 2025-03-31 LAB
RUBV IGG SER-ACNC: 2.74 INDEX — SIGNIFICANT CHANGE UP
RUBV IGG SER-IMP: POSITIVE — SIGNIFICANT CHANGE UP
T PALLIDUM AB TITR SER: NEGATIVE — SIGNIFICANT CHANGE UP

## 2025-03-31 RX ORDER — IBUPROFEN 200 MG
600 TABLET ORAL EVERY 6 HOURS
Refills: 0 | Status: DISCONTINUED | OUTPATIENT
Start: 2025-03-31 | End: 2025-04-01

## 2025-03-31 RX ADMIN — Medication 975 MILLIGRAM(S): at 13:30

## 2025-03-31 RX ADMIN — Medication 600 MILLIGRAM(S): at 08:20

## 2025-03-31 RX ADMIN — Medication 975 MILLIGRAM(S): at 17:56

## 2025-03-31 RX ADMIN — Medication 975 MILLIGRAM(S): at 12:33

## 2025-03-31 RX ADMIN — Medication 600 MILLIGRAM(S): at 07:22

## 2025-03-31 RX ADMIN — Medication 600 MILLIGRAM(S): at 21:19

## 2025-03-31 RX ADMIN — Medication 1 TABLET(S): at 12:34

## 2025-03-31 RX ADMIN — Medication 600 MILLIGRAM(S): at 22:19

## 2025-03-31 RX ADMIN — Medication 600 MILLIGRAM(S): at 15:45

## 2025-03-31 RX ADMIN — Medication 600 MILLIGRAM(S): at 14:54

## 2025-03-31 RX ADMIN — KETOROLAC TROMETHAMINE 30 MILLIGRAM(S): 30 INJECTION, SOLUTION INTRAMUSCULAR; INTRAVENOUS at 00:01

## 2025-03-31 RX ADMIN — Medication 975 MILLIGRAM(S): at 23:36

## 2025-03-31 NOTE — PROGRESS NOTE ADULT - ASSESSMENT
A/P: 24y  PPD # 1 S/P  doing well    Current Issues: None    PAST MEDICAL & SURGICAL HISTORY:  No pertinent past medical history    S/P tonsillectomy

## 2025-04-01 ENCOUNTER — TRANSCRIPTION ENCOUNTER (OUTPATIENT)
Age: 25
End: 2025-04-01

## 2025-04-01 VITALS
TEMPERATURE: 98 F | DIASTOLIC BLOOD PRESSURE: 68 MMHG | RESPIRATION RATE: 18 BRPM | SYSTOLIC BLOOD PRESSURE: 98 MMHG | OXYGEN SATURATION: 96 % | HEART RATE: 71 BPM

## 2025-04-01 RX ORDER — PRENATAL 136/IRON/FOLIC ACID 27 MG-1 MG
1 TABLET ORAL
Qty: 0 | Refills: 0 | DISCHARGE
Start: 2025-04-01

## 2025-04-01 RX ORDER — IBUPROFEN 200 MG
1 TABLET ORAL
Qty: 0 | Refills: 0 | DISCHARGE
Start: 2025-04-01

## 2025-04-01 RX ORDER — ACETAMINOPHEN 500 MG/5ML
3 LIQUID (ML) ORAL
Qty: 0 | Refills: 0 | DISCHARGE
Start: 2025-04-01

## 2025-04-01 RX ADMIN — Medication 600 MILLIGRAM(S): at 09:35

## 2025-04-01 RX ADMIN — Medication 600 MILLIGRAM(S): at 10:17

## 2025-04-01 RX ADMIN — Medication 975 MILLIGRAM(S): at 00:36

## 2025-04-01 RX ADMIN — Medication 600 MILLIGRAM(S): at 02:37

## 2025-04-01 RX ADMIN — Medication 600 MILLIGRAM(S): at 03:35

## 2025-04-01 NOTE — DISCHARGE NOTE OB - CARE PROVIDERS DIRECT ADDRESSES
[email or web address]

## 2025-04-01 NOTE — DISCHARGE NOTE OB - PLAN OF CARE
take oral iron supplements follow up in 6 weeks for PPV. pelvic rest. activity as tolerated. regular diet.

## 2025-04-01 NOTE — PROGRESS NOTE ADULT - SUBJECTIVE AND OBJECTIVE BOX
PPD#2- ATTENDING NOTE    S: Patient doing well. Minimal lochia. Pain controlled.    O: Vital Signs Last 24 Hrs  T(C): 36.6 (2025 06:15), Max: 36.7 (31 Mar 2025 13:09)  T(F): 97.9 (2025 06:15), Max: 98.1 (31 Mar 2025 17:06)  HR: 71 (2025 06:15) (69 - 72)  BP: 98/68 (2025 06:15) (94/65 - 105/72)  BP(mean): --  RR: 18 (2025 06:15) (18 - 18)  SpO2: 96% (2025 06:15) (96% - 100%)    Parameters below as of 2025 06:15  Patient On (Oxygen Delivery Method): room air        Gen: NAD  Abd: soft, NT, ND, fundus firm below umbilicus  Lochia: moderate  Ext: no tenderness              A: 24y PPD#2 s/p  doing well.    Plan:  Analgesia prn  Regular diet  mild anemia, take iron supplement  Discharge instruction given  F/U 6 weeks
Postpartum Note- PPD#1    Allergies; No Known Allergies    Blood Type A   Positive  Rubella Immune  RPR : Negative    S:Patient is a  24y   PPD#1 S/P    Patient w/o complaints, pain is controlled.    Pt is OOB, tolerating PO, passing flatus. Lochia WNL.     Feeding: Breast/Bottle    O:  Vital Signs Last 24 Hrs  T(C): 36.6 (31 Mar 2025 05:14), Max: 37.6 (30 Mar 2025 22:01)  T(F): 97.8 (31 Mar 2025 05:14), Max: 99.68 (30 Mar 2025 22:01)  HR: 80 (31 Mar 2025 05:14) (63 - 154)  BP: 107/61 (31 Mar 2025 05:14) (94/48 - 147/62)  BP(mean): --  RR: 18 (31 Mar 2025 05:14) (18 - 20)  SpO2: 98% (31 Mar 2025 05:14) (88% - 100%)     Gen: NAD  Abdomen: Soft, nontender, non-distended, fundus firm.  Vaginal: Lochia WNL  Ext: Neg calf tenderness, neg edema    LABS:    Hemoglobin: 9.5 g/dL ( @ 08:12)      Hematocrit: 30.3 % ( @ 08:12)

## 2025-04-01 NOTE — DISCHARGE NOTE OB - CARE PLAN
Principal Discharge DX:	 (normal spontaneous vaginal delivery)  Assessment and plan of treatment:	follow up in 6 weeks for PPV. pelvic rest. activity as tolerated. regular diet.  Secondary Diagnosis:	Mild anemia  Assessment and plan of treatment:	take oral iron supplements   1

## 2025-04-01 NOTE — DISCHARGE NOTE OB - CARE PROVIDER_API CALL
Nabila Marshall  Obstetrics and Gynecology  [address] [place] [address], [facility] [address]  [place] [address]  Phone: [phone]  Fax: [phone]  Follow Up Time:

## 2025-04-01 NOTE — DISCHARGE NOTE OB - FINANCIAL ASSISTANCE
[facility] provides services at a reduced cost to those who are determined to be eligible through [facility]’s financial assistance program. Information regarding [facility]’s financial assistance program can be found by going to [email or web address] or by calling [phone].

## 2025-04-01 NOTE — DISCHARGE NOTE OB - IF YOU ARE A SMOKER, IT IS IMPORTANT FOR YOUR HEALTH TO STOP SMOKING. PLEASE BE AWARE THAT SECOND HAND SMOKE IS ALSO HARMFUL.
Called patient regarding the prescriptions he requested refills on.  No answer, voicemail full, unable to leave message     Statement Selected

## 2025-04-01 NOTE — DISCHARGE NOTE OB - MEDICATION SUMMARY - MEDICATIONS TO STOP TAKING
I will STOP taking the medications listed below when I get home from the hospital:    Naprosyn 500 mg oral tablet  -- 1 tab(s) by mouth 2 times a day   -- Check with your doctor before becoming pregnant.  May cause drowsiness or dizziness.  Obtain medical advice before taking any non-prescription drugs as some may affect the action of this medication.  Take with food or milk.    amoxicillin 500 mg oral capsule  -- 2 cap(s) by mouth once a day  -- Finish all this medication unless otherwise directed by prescriber.    Bactrim  mg-160 mg oral tablet  -- 1 milligram(s) by mouth 2 times a day   -- Avoid prolonged or excessive exposure to direct and/or artificial sunlight while taking this medication.  Finish all this medication unless otherwise directed by prescriber.  Medication should be taken with plenty of water.

## 2025-04-01 NOTE — DISCHARGE NOTE OB - PATIENT PORTAL LINK FT
You can access the FollowMyHealth Patient Portal offered by [facility] by registering at the following website: [email or web address]. By joining [facility]’s FollowMyHealth portal, you will also be able to view your health information using other applications (apps) compatible with our system.

## 2025-05-15 ENCOUNTER — APPOINTMENT (OUTPATIENT)
Dept: OBGYN | Facility: CLINIC | Age: 25
End: 2025-05-15
Payer: COMMERCIAL

## 2025-05-15 VITALS
DIASTOLIC BLOOD PRESSURE: 81 MMHG | SYSTOLIC BLOOD PRESSURE: 112 MMHG | WEIGHT: 208 LBS | BODY MASS INDEX: 34.66 KG/M2 | HEIGHT: 65 IN

## 2025-05-15 PROCEDURE — 0503F POSTPARTUM CARE VISIT: CPT

## 2025-06-08 ENCOUNTER — NON-APPOINTMENT (OUTPATIENT)
Age: 25
End: 2025-06-08

## 2025-06-10 PROCEDURE — 86780 TREPONEMA PALLIDUM: CPT

## 2025-06-10 PROCEDURE — 86762 RUBELLA ANTIBODY: CPT

## 2025-06-10 PROCEDURE — 86850 RBC ANTIBODY SCREEN: CPT

## 2025-06-10 PROCEDURE — 36415 COLL VENOUS BLD VENIPUNCTURE: CPT

## 2025-06-10 PROCEDURE — 59050 FETAL MONITOR W/REPORT: CPT

## 2025-06-10 PROCEDURE — 86900 BLOOD TYPING SEROLOGIC ABO: CPT

## 2025-06-10 PROCEDURE — 86901 BLOOD TYPING SEROLOGIC RH(D): CPT

## 2025-06-10 PROCEDURE — 85025 COMPLETE CBC W/AUTO DIFF WBC: CPT

## 2025-06-10 PROCEDURE — 87340 HEPATITIS B SURFACE AG IA: CPT

## 2025-06-25 ENCOUNTER — APPOINTMENT (OUTPATIENT)
Dept: CARDIOLOGY | Facility: CLINIC | Age: 25
End: 2025-06-25

## 2025-07-02 ENCOUNTER — APPOINTMENT (OUTPATIENT)
Dept: OBGYN | Facility: CLINIC | Age: 25
End: 2025-07-02
Payer: COMMERCIAL

## 2025-07-02 VITALS
HEART RATE: 88 BPM | HEIGHT: 65 IN | SYSTOLIC BLOOD PRESSURE: 112 MMHG | DIASTOLIC BLOOD PRESSURE: 71 MMHG | BODY MASS INDEX: 35.32 KG/M2 | WEIGHT: 212 LBS

## 2025-07-02 PROCEDURE — 81025 URINE PREGNANCY TEST: CPT

## 2025-07-02 PROCEDURE — 58300 INSERT INTRAUTERINE DEVICE: CPT

## 2025-07-24 DIAGNOSIS — M62.89 OTHER SPECIFIED DISORDERS OF MUSCLE: ICD-10-CM

## 2025-07-28 ENCOUNTER — APPOINTMENT (OUTPATIENT)
Dept: OBGYN | Facility: CLINIC | Age: 25
End: 2025-07-28
Payer: COMMERCIAL

## 2025-07-28 VITALS
BODY MASS INDEX: 35.16 KG/M2 | HEIGHT: 65 IN | WEIGHT: 211 LBS | SYSTOLIC BLOOD PRESSURE: 109 MMHG | DIASTOLIC BLOOD PRESSURE: 74 MMHG

## 2025-07-28 DIAGNOSIS — R10.2 PELVIC AND PERINEAL PAIN: ICD-10-CM

## 2025-07-28 PROCEDURE — 99213 OFFICE O/P EST LOW 20 MIN: CPT

## 2025-09-02 ENCOUNTER — APPOINTMENT (OUTPATIENT)
Dept: OBGYN | Facility: CLINIC | Age: 25
End: 2025-09-02